# Patient Record
Sex: FEMALE | Race: WHITE | NOT HISPANIC OR LATINO
[De-identification: names, ages, dates, MRNs, and addresses within clinical notes are randomized per-mention and may not be internally consistent; named-entity substitution may affect disease eponyms.]

---

## 2017-03-16 ENCOUNTER — APPOINTMENT (OUTPATIENT)
Dept: MAMMOGRAPHY | Facility: CLINIC | Age: 77
End: 2017-03-16

## 2017-03-16 ENCOUNTER — OUTPATIENT (OUTPATIENT)
Dept: OUTPATIENT SERVICES | Facility: HOSPITAL | Age: 77
LOS: 1 days | End: 2017-03-16
Payer: MEDICARE

## 2017-03-16 ENCOUNTER — APPOINTMENT (OUTPATIENT)
Dept: ULTRASOUND IMAGING | Facility: CLINIC | Age: 77
End: 2017-03-16

## 2017-03-16 DIAGNOSIS — Z00.8 ENCOUNTER FOR OTHER GENERAL EXAMINATION: ICD-10-CM

## 2017-03-16 PROCEDURE — 77066 DX MAMMO INCL CAD BI: CPT

## 2017-03-16 PROCEDURE — 76641 ULTRASOUND BREAST COMPLETE: CPT

## 2017-03-16 PROCEDURE — G0279: CPT

## 2017-03-19 ENCOUNTER — RESULT REVIEW (OUTPATIENT)
Age: 77
End: 2017-03-19

## 2017-03-20 ENCOUNTER — OUTPATIENT (OUTPATIENT)
Dept: OUTPATIENT SERVICES | Facility: HOSPITAL | Age: 77
LOS: 1 days | End: 2017-03-20
Payer: MEDICARE

## 2017-03-20 ENCOUNTER — APPOINTMENT (OUTPATIENT)
Dept: ULTRASOUND IMAGING | Facility: CLINIC | Age: 77
End: 2017-03-20

## 2017-03-20 DIAGNOSIS — Z00.8 ENCOUNTER FOR OTHER GENERAL EXAMINATION: ICD-10-CM

## 2017-03-20 PROCEDURE — 76942 ECHO GUIDE FOR BIOPSY: CPT

## 2017-03-20 PROCEDURE — 19000 PUNCTURE ASPIR CYST BREAST: CPT

## 2017-03-20 PROCEDURE — 77065 DX MAMMO INCL CAD UNI: CPT

## 2017-10-04 ENCOUNTER — APPOINTMENT (OUTPATIENT)
Dept: SURGERY | Facility: CLINIC | Age: 77
End: 2017-10-04
Payer: MEDICARE

## 2017-10-04 PROCEDURE — 99213K: CUSTOM

## 2017-10-24 ENCOUNTER — OUTPATIENT (OUTPATIENT)
Dept: OUTPATIENT SERVICES | Facility: HOSPITAL | Age: 77
LOS: 1 days | End: 2017-10-24
Payer: MEDICARE

## 2017-10-24 ENCOUNTER — APPOINTMENT (OUTPATIENT)
Dept: ULTRASOUND IMAGING | Facility: CLINIC | Age: 77
End: 2017-10-24
Payer: MEDICARE

## 2017-10-24 DIAGNOSIS — Z00.8 ENCOUNTER FOR OTHER GENERAL EXAMINATION: ICD-10-CM

## 2017-10-24 PROCEDURE — 76641 ULTRASOUND BREAST COMPLETE: CPT | Mod: 26,50

## 2017-10-24 PROCEDURE — 76641 ULTRASOUND BREAST COMPLETE: CPT

## 2018-05-15 ENCOUNTER — APPOINTMENT (OUTPATIENT)
Dept: ULTRASOUND IMAGING | Facility: CLINIC | Age: 78
End: 2018-05-15
Payer: MEDICARE

## 2018-05-15 ENCOUNTER — OUTPATIENT (OUTPATIENT)
Dept: OUTPATIENT SERVICES | Facility: HOSPITAL | Age: 78
LOS: 1 days | End: 2018-05-15
Payer: MEDICARE

## 2018-05-15 ENCOUNTER — APPOINTMENT (OUTPATIENT)
Dept: MAMMOGRAPHY | Facility: CLINIC | Age: 78
End: 2018-05-15
Payer: MEDICARE

## 2018-05-15 DIAGNOSIS — Z00.8 ENCOUNTER FOR OTHER GENERAL EXAMINATION: ICD-10-CM

## 2018-05-15 PROCEDURE — 76641 ULTRASOUND BREAST COMPLETE: CPT | Mod: 26,50

## 2018-05-15 PROCEDURE — G0279: CPT | Mod: 26

## 2018-05-15 PROCEDURE — 77066 DX MAMMO INCL CAD BI: CPT

## 2018-05-15 PROCEDURE — G0279: CPT

## 2018-05-15 PROCEDURE — 76641 ULTRASOUND BREAST COMPLETE: CPT

## 2018-05-15 PROCEDURE — 77066 DX MAMMO INCL CAD BI: CPT | Mod: 26

## 2018-08-20 ENCOUNTER — APPOINTMENT (OUTPATIENT)
Dept: SURGERY | Facility: CLINIC | Age: 78
End: 2018-08-20
Payer: MEDICARE

## 2018-08-20 PROCEDURE — 99213K: CUSTOM

## 2019-06-17 ENCOUNTER — OUTPATIENT (OUTPATIENT)
Dept: OUTPATIENT SERVICES | Facility: HOSPITAL | Age: 79
LOS: 1 days | End: 2019-06-17
Payer: MEDICARE

## 2019-06-17 ENCOUNTER — APPOINTMENT (OUTPATIENT)
Dept: MAMMOGRAPHY | Facility: CLINIC | Age: 79
End: 2019-06-17
Payer: MEDICARE

## 2019-06-17 ENCOUNTER — APPOINTMENT (OUTPATIENT)
Dept: ULTRASOUND IMAGING | Facility: CLINIC | Age: 79
End: 2019-06-17
Payer: MEDICARE

## 2019-06-17 DIAGNOSIS — Z00.8 ENCOUNTER FOR OTHER GENERAL EXAMINATION: ICD-10-CM

## 2019-06-17 PROCEDURE — 77063 BREAST TOMOSYNTHESIS BI: CPT | Mod: 26

## 2019-06-17 PROCEDURE — 77063 BREAST TOMOSYNTHESIS BI: CPT

## 2019-06-17 PROCEDURE — G0279: CPT

## 2019-06-17 PROCEDURE — 76641 ULTRASOUND BREAST COMPLETE: CPT

## 2019-06-17 PROCEDURE — 77067 SCR MAMMO BI INCL CAD: CPT | Mod: 26

## 2019-06-17 PROCEDURE — 76641 ULTRASOUND BREAST COMPLETE: CPT | Mod: 26,50

## 2019-06-17 PROCEDURE — 77067 SCR MAMMO BI INCL CAD: CPT

## 2019-06-17 PROCEDURE — 77066 DX MAMMO INCL CAD BI: CPT

## 2019-09-16 ENCOUNTER — APPOINTMENT (OUTPATIENT)
Dept: SURGERY | Facility: CLINIC | Age: 79
End: 2019-09-16
Payer: MEDICARE

## 2019-09-16 PROCEDURE — 99213K: CUSTOM

## 2020-08-03 ENCOUNTER — RESULT REVIEW (OUTPATIENT)
Age: 80
End: 2020-08-03

## 2020-08-03 ENCOUNTER — OUTPATIENT (OUTPATIENT)
Dept: OUTPATIENT SERVICES | Facility: HOSPITAL | Age: 80
LOS: 1 days | End: 2020-08-03
Payer: MEDICARE

## 2020-08-03 ENCOUNTER — APPOINTMENT (OUTPATIENT)
Dept: MAMMOGRAPHY | Facility: CLINIC | Age: 80
End: 2020-08-03
Payer: MEDICARE

## 2020-08-03 ENCOUNTER — APPOINTMENT (OUTPATIENT)
Dept: ULTRASOUND IMAGING | Facility: CLINIC | Age: 80
End: 2020-08-03
Payer: MEDICARE

## 2020-08-03 DIAGNOSIS — Z00.8 ENCOUNTER FOR OTHER GENERAL EXAMINATION: ICD-10-CM

## 2020-08-03 PROCEDURE — 76641 ULTRASOUND BREAST COMPLETE: CPT | Mod: 26,50

## 2020-08-03 PROCEDURE — 77067 SCR MAMMO BI INCL CAD: CPT | Mod: 26

## 2020-08-03 PROCEDURE — 77067 SCR MAMMO BI INCL CAD: CPT

## 2020-08-03 PROCEDURE — 77063 BREAST TOMOSYNTHESIS BI: CPT

## 2020-08-03 PROCEDURE — 77063 BREAST TOMOSYNTHESIS BI: CPT | Mod: 26

## 2020-08-03 PROCEDURE — 76641 ULTRASOUND BREAST COMPLETE: CPT

## 2021-04-05 ENCOUNTER — APPOINTMENT (OUTPATIENT)
Dept: OPHTHALMOLOGY | Facility: CLINIC | Age: 81
End: 2021-04-05

## 2021-06-16 ENCOUNTER — APPOINTMENT (OUTPATIENT)
Dept: OPHTHALMOLOGY | Facility: CLINIC | Age: 81
End: 2021-06-16

## 2021-06-30 ENCOUNTER — NON-APPOINTMENT (OUTPATIENT)
Age: 81
End: 2021-06-30

## 2021-06-30 ENCOUNTER — APPOINTMENT (OUTPATIENT)
Dept: OPHTHALMOLOGY | Facility: CLINIC | Age: 81
End: 2021-06-30
Payer: MEDICARE

## 2021-06-30 PROCEDURE — 92060 SENSORIMOTOR EXAMINATION: CPT

## 2021-06-30 PROCEDURE — 92015 DETERMINE REFRACTIVE STATE: CPT

## 2021-06-30 PROCEDURE — 99215 OFFICE O/P EST HI 40 MIN: CPT

## 2021-08-24 ENCOUNTER — RESULT REVIEW (OUTPATIENT)
Age: 81
End: 2021-08-24

## 2021-08-24 ENCOUNTER — APPOINTMENT (OUTPATIENT)
Dept: MAMMOGRAPHY | Facility: CLINIC | Age: 81
End: 2021-08-24
Payer: MEDICARE

## 2021-08-24 ENCOUNTER — OUTPATIENT (OUTPATIENT)
Dept: OUTPATIENT SERVICES | Facility: HOSPITAL | Age: 81
LOS: 1 days | End: 2021-08-24
Payer: MEDICARE

## 2021-08-24 ENCOUNTER — APPOINTMENT (OUTPATIENT)
Dept: ULTRASOUND IMAGING | Facility: CLINIC | Age: 81
End: 2021-08-24
Payer: MEDICARE

## 2021-08-24 DIAGNOSIS — Z00.8 ENCOUNTER FOR OTHER GENERAL EXAMINATION: ICD-10-CM

## 2021-08-24 PROCEDURE — 76641 ULTRASOUND BREAST COMPLETE: CPT

## 2021-08-24 PROCEDURE — 76641 ULTRASOUND BREAST COMPLETE: CPT | Mod: 26,50

## 2021-08-24 PROCEDURE — G0279: CPT | Mod: 26

## 2021-08-24 PROCEDURE — 77066 DX MAMMO INCL CAD BI: CPT

## 2021-08-24 PROCEDURE — G0279: CPT

## 2021-08-24 PROCEDURE — 77066 DX MAMMO INCL CAD BI: CPT | Mod: 26

## 2021-08-30 ENCOUNTER — APPOINTMENT (OUTPATIENT)
Dept: SURGERY | Facility: CLINIC | Age: 81
End: 2021-08-30
Payer: MEDICARE

## 2021-08-30 PROCEDURE — 99214K: CUSTOM

## 2021-08-31 ENCOUNTER — OUTPATIENT (OUTPATIENT)
Dept: OUTPATIENT SERVICES | Facility: HOSPITAL | Age: 81
LOS: 1 days | End: 2021-08-31
Payer: MEDICARE

## 2021-08-31 ENCOUNTER — APPOINTMENT (OUTPATIENT)
Dept: MRI IMAGING | Facility: IMAGING CENTER | Age: 81
End: 2021-08-31
Payer: MEDICARE

## 2021-08-31 ENCOUNTER — RESULT REVIEW (OUTPATIENT)
Age: 81
End: 2021-08-31

## 2021-08-31 DIAGNOSIS — Z00.8 ENCOUNTER FOR OTHER GENERAL EXAMINATION: ICD-10-CM

## 2021-08-31 PROCEDURE — 77049 MRI BREAST C-+ W/CAD BI: CPT | Mod: 26,MH

## 2021-08-31 PROCEDURE — A9585: CPT

## 2021-08-31 PROCEDURE — C8937: CPT

## 2021-08-31 PROCEDURE — C8908: CPT | Mod: MH

## 2021-09-08 ENCOUNTER — OUTPATIENT (OUTPATIENT)
Dept: OUTPATIENT SERVICES | Facility: HOSPITAL | Age: 81
LOS: 1 days | End: 2021-09-08
Payer: MEDICARE

## 2021-09-08 ENCOUNTER — RESULT REVIEW (OUTPATIENT)
Age: 81
End: 2021-09-08

## 2021-09-08 ENCOUNTER — APPOINTMENT (OUTPATIENT)
Dept: MRI IMAGING | Facility: IMAGING CENTER | Age: 81
End: 2021-09-08
Payer: MEDICARE

## 2021-09-08 DIAGNOSIS — Z00.8 ENCOUNTER FOR OTHER GENERAL EXAMINATION: ICD-10-CM

## 2021-09-08 PROCEDURE — 19085 BX BREAST 1ST LESION MR IMAG: CPT

## 2021-09-08 PROCEDURE — 88360 TUMOR IMMUNOHISTOCHEM/MANUAL: CPT | Mod: 26

## 2021-09-08 PROCEDURE — 77065 DX MAMMO INCL CAD UNI: CPT

## 2021-09-08 PROCEDURE — 19086 BX BREAST ADD LESION MR IMAG: CPT | Mod: RT

## 2021-09-08 PROCEDURE — 19086 BX BREAST ADD LESION MR IMAG: CPT

## 2021-09-08 PROCEDURE — 77065 DX MAMMO INCL CAD UNI: CPT | Mod: 26,RT

## 2021-09-08 PROCEDURE — 19085 BX BREAST 1ST LESION MR IMAG: CPT | Mod: RT

## 2021-09-08 PROCEDURE — A9585: CPT

## 2021-09-08 PROCEDURE — 88305 TISSUE EXAM BY PATHOLOGIST: CPT | Mod: 26,59

## 2021-09-08 PROCEDURE — 88360 TUMOR IMMUNOHISTOCHEM/MANUAL: CPT

## 2021-09-08 PROCEDURE — 88305 TISSUE EXAM BY PATHOLOGIST: CPT

## 2021-09-09 LAB — SURGICAL PATHOLOGY STUDY: SIGNIFICANT CHANGE UP

## 2021-09-20 ENCOUNTER — OUTPATIENT (OUTPATIENT)
Dept: OUTPATIENT SERVICES | Facility: HOSPITAL | Age: 81
LOS: 1 days | End: 2021-09-20
Payer: MEDICARE

## 2021-09-20 ENCOUNTER — APPOINTMENT (OUTPATIENT)
Dept: MAMMOGRAPHY | Facility: IMAGING CENTER | Age: 81
End: 2021-09-20
Payer: MEDICARE

## 2021-09-20 ENCOUNTER — RESULT REVIEW (OUTPATIENT)
Age: 81
End: 2021-09-20

## 2021-09-20 VITALS
OXYGEN SATURATION: 98 % | HEART RATE: 81 BPM | WEIGHT: 151.02 LBS | DIASTOLIC BLOOD PRESSURE: 96 MMHG | HEIGHT: 64 IN | SYSTOLIC BLOOD PRESSURE: 166 MMHG | TEMPERATURE: 97 F | RESPIRATION RATE: 16 BRPM

## 2021-09-20 DIAGNOSIS — Z98.890 OTHER SPECIFIED POSTPROCEDURAL STATES: Chronic | ICD-10-CM

## 2021-09-20 DIAGNOSIS — H26.9 UNSPECIFIED CATARACT: Chronic | ICD-10-CM

## 2021-09-20 DIAGNOSIS — Z00.8 ENCOUNTER FOR OTHER GENERAL EXAMINATION: ICD-10-CM

## 2021-09-20 DIAGNOSIS — D05.11 INTRADUCTAL CARCINOMA IN SITU OF RIGHT BREAST: ICD-10-CM

## 2021-09-20 DIAGNOSIS — I10 ESSENTIAL (PRIMARY) HYPERTENSION: ICD-10-CM

## 2021-09-20 DIAGNOSIS — C50.919 MALIGNANT NEOPLASM OF UNSPECIFIED SITE OF UNSPECIFIED FEMALE BREAST: ICD-10-CM

## 2021-09-20 LAB
ALBUMIN SERPL ELPH-MCNC: 4.4 G/DL — SIGNIFICANT CHANGE UP (ref 3.3–5)
ALP SERPL-CCNC: 80 U/L — SIGNIFICANT CHANGE UP (ref 40–120)
ALT FLD-CCNC: 14 U/L — SIGNIFICANT CHANGE UP (ref 4–33)
ANION GAP SERPL CALC-SCNC: 9 MMOL/L — SIGNIFICANT CHANGE UP (ref 7–14)
AST SERPL-CCNC: 16 U/L — SIGNIFICANT CHANGE UP (ref 4–32)
BILIRUB SERPL-MCNC: 0.4 MG/DL — SIGNIFICANT CHANGE UP (ref 0.2–1.2)
BUN SERPL-MCNC: 12 MG/DL — SIGNIFICANT CHANGE UP (ref 7–23)
CALCIUM SERPL-MCNC: 9.5 MG/DL — SIGNIFICANT CHANGE UP (ref 8.4–10.5)
CHLORIDE SERPL-SCNC: 104 MMOL/L — SIGNIFICANT CHANGE UP (ref 98–107)
CO2 SERPL-SCNC: 27 MMOL/L — SIGNIFICANT CHANGE UP (ref 22–31)
CREAT SERPL-MCNC: 0.98 MG/DL — SIGNIFICANT CHANGE UP (ref 0.5–1.3)
GLUCOSE SERPL-MCNC: 85 MG/DL — SIGNIFICANT CHANGE UP (ref 70–99)
HCT VFR BLD CALC: 41.7 % — SIGNIFICANT CHANGE UP (ref 34.5–45)
HGB BLD-MCNC: 14.2 G/DL — SIGNIFICANT CHANGE UP (ref 11.5–15.5)
MCHC RBC-ENTMCNC: 30.9 PG — SIGNIFICANT CHANGE UP (ref 27–34)
MCHC RBC-ENTMCNC: 34.1 GM/DL — SIGNIFICANT CHANGE UP (ref 32–36)
MCV RBC AUTO: 90.8 FL — SIGNIFICANT CHANGE UP (ref 80–100)
NRBC # BLD: 0 /100 WBCS — SIGNIFICANT CHANGE UP
NRBC # FLD: 0 K/UL — SIGNIFICANT CHANGE UP
PLATELET # BLD AUTO: 273 K/UL — SIGNIFICANT CHANGE UP (ref 150–400)
POTASSIUM SERPL-MCNC: 3.8 MMOL/L — SIGNIFICANT CHANGE UP (ref 3.5–5.3)
POTASSIUM SERPL-SCNC: 3.8 MMOL/L — SIGNIFICANT CHANGE UP (ref 3.5–5.3)
PROT SERPL-MCNC: 7.6 G/DL — SIGNIFICANT CHANGE UP (ref 6–8.3)
RBC # BLD: 4.59 M/UL — SIGNIFICANT CHANGE UP (ref 3.8–5.2)
RBC # FLD: 13.3 % — SIGNIFICANT CHANGE UP (ref 10.3–14.5)
SODIUM SERPL-SCNC: 140 MMOL/L — SIGNIFICANT CHANGE UP (ref 135–145)
WBC # BLD: 8.2 K/UL — SIGNIFICANT CHANGE UP (ref 3.8–10.5)
WBC # FLD AUTO: 8.2 K/UL — SIGNIFICANT CHANGE UP (ref 3.8–10.5)

## 2021-09-20 PROCEDURE — C1739: CPT

## 2021-09-20 PROCEDURE — 93010 ELECTROCARDIOGRAM REPORT: CPT

## 2021-09-20 PROCEDURE — 19281 PERQ DEVICE BREAST 1ST IMAG: CPT

## 2021-09-20 PROCEDURE — 19281 PERQ DEVICE BREAST 1ST IMAG: CPT | Mod: RT

## 2021-09-20 RX ORDER — SODIUM CHLORIDE 9 MG/ML
1000 INJECTION, SOLUTION INTRAVENOUS
Refills: 0 | Status: DISCONTINUED | OUTPATIENT
Start: 2021-09-28 | End: 2021-10-12

## 2021-09-20 NOTE — H&P PST ADULT - PROBLEM SELECTOR PLAN 1
Right Partial Mastectomy with Seed Localization    Pre op instructions including Hibiclens with teach back reviewed with pt ; pt verbalized good understanding of pre op instructions     Pt to Dr Pascual for pre op medical evaluation and evaluation of BP

## 2021-09-20 NOTE — H&P PST ADULT - NEGATIVE OPHTHALMOLOGIC SYMPTOMS
Records received. Placed with provider for review for visit.    s/p cataract excision/no diplopia/no photophobia/no blurred vision L/no blurred vision R

## 2021-09-20 NOTE — H&P PST ADULT - NSANTHOSAYNRD_GEN_A_CORE
No. ELIS screening performed.  STOP BANG Legend: 0-2 = LOW Risk; 3-4 = INTERMEDIATE Risk; 5-8 = HIGH Risk

## 2021-09-20 NOTE — H&P PST ADULT - NSICDXPASTSURGICALHX_GEN_ALL_CORE_FT
PAST SURGICAL HISTORY:  Bilateral cataracts Left 11/2020, Right 5/21    H/O lumpectomy Right breast 2013    History of tonsillectomy as a child    Left breast mass 2006 lumpectomy, removal of 2 lymph nodes

## 2021-09-20 NOTE — H&P PST ADULT - PROBLEM SELECTOR PLAN 2
BP elevated in PST  Pt denies cp, denies palpitations, denies sob ; pt offers no c/o  Pt to Dr Pascual for pre op evaluation  Call to Dr Ramsay; s/w bre

## 2021-09-20 NOTE — H&P PST ADULT - NS MD HP INPLANTS MED DEV
Breast clips ;Right with seed localization, Bilateral lenses s/p cataract surgery Breast clips ;Right breast with seed localization, Bilateral lenses s/p cataract surgery

## 2021-09-20 NOTE — H&P PST ADULT - NSICDXPASTMEDICALHX_GEN_ALL_CORE_FT
PAST MEDICAL HISTORY:  Anxiety     Breast cancer 2006  Left breast s/p RT , right Breast 2013 ; tx surgically tx RT       Goiter per pt monitored by pcp    Hyperlipidemia      PAST MEDICAL HISTORY:  Anxiety     Breast cancer 2006  Left breast tx surgically s/p RT , right Breast 2013 ; tx surgically s/p RT       Goiter per pt monitored by pcp    Hyperlipidemia

## 2021-09-20 NOTE — H&P PST ADULT - HISTORY OF PRESENT ILLNESS
Pt is n 81  y.o. female ; pt with h/o  Left Breast Cancer ; s/p Left Breast Lumpectomy ; tx RT. Pt s/p Right Breast Lumpectomy 2013 ; per pt DCIS. Pt s/p  Mammogram / Sonogram 8/21. Pt reports " ? blood right nipple " Pt to surgeon ; s/p MRI ; pt reports " something in my right breast " Pt s/p Biopsy ; pt now presents for  Right Partial Mastectomy with Seed Localization    Pt denies breast pain  Pt is an  81  y.o. female ; pt with h/o  Left Breast Cancer ; s/p Left Breast Lumpectomy ; tx RT. Pt s/p Right Breast Lumpectomy 2013 ; per pt"  DCIS" . Pt s/p  Mammogram / Sonogram 8/21. Pt reports " ?  blood right nipple " Pt to surgeon ; s/p MRI ; pt reports " something in my right breast " Pt s/p Biopsy ; pt now presents for  Right Partial Mastectomy with Seed Localization    Pt denies breast pain

## 2021-09-24 ENCOUNTER — APPOINTMENT (OUTPATIENT)
Dept: DERMATOLOGY | Facility: CLINIC | Age: 81
End: 2021-09-24
Payer: MEDICARE

## 2021-09-24 PROCEDURE — 99203 OFFICE O/P NEW LOW 30 MIN: CPT

## 2021-09-25 ENCOUNTER — APPOINTMENT (OUTPATIENT)
Dept: DISASTER EMERGENCY | Facility: CLINIC | Age: 81
End: 2021-09-25

## 2021-09-26 LAB — SARS-COV-2 N GENE NPH QL NAA+PROBE: NOT DETECTED

## 2021-09-27 NOTE — ASU PATIENT PROFILE, ADULT - NSICDXPASTMEDICALHX_GEN_ALL_CORE_FT
PAST MEDICAL HISTORY:  Anxiety     Breast cancer 2006  Left breast tx surgically s/p RT , right Breast 2013 ; tx surgically s/p RT       Goiter per pt monitored by pcp    Hyperlipidemia

## 2021-09-28 ENCOUNTER — APPOINTMENT (OUTPATIENT)
Dept: SURGERY | Facility: HOSPITAL | Age: 81
End: 2021-09-28

## 2021-09-28 ENCOUNTER — OUTPATIENT (OUTPATIENT)
Dept: OUTPATIENT SERVICES | Facility: HOSPITAL | Age: 81
LOS: 1 days | Discharge: HOME | End: 2021-09-28
Payer: MEDICARE

## 2021-09-28 ENCOUNTER — RESULT REVIEW (OUTPATIENT)
Age: 81
End: 2021-09-28

## 2021-09-28 VITALS
OXYGEN SATURATION: 100 % | SYSTOLIC BLOOD PRESSURE: 148 MMHG | RESPIRATION RATE: 16 BRPM | HEART RATE: 68 BPM | DIASTOLIC BLOOD PRESSURE: 89 MMHG

## 2021-09-28 VITALS
RESPIRATION RATE: 16 BRPM | OXYGEN SATURATION: 96 % | SYSTOLIC BLOOD PRESSURE: 154 MMHG | HEIGHT: 64 IN | TEMPERATURE: 98 F | DIASTOLIC BLOOD PRESSURE: 82 MMHG | WEIGHT: 151.02 LBS | HEART RATE: 82 BPM

## 2021-09-28 DIAGNOSIS — Z98.890 OTHER SPECIFIED POSTPROCEDURAL STATES: Chronic | ICD-10-CM

## 2021-09-28 DIAGNOSIS — D05.11 INTRADUCTAL CARCINOMA IN SITU OF RIGHT BREAST: ICD-10-CM

## 2021-09-28 DIAGNOSIS — H26.9 UNSPECIFIED CATARACT: Chronic | ICD-10-CM

## 2021-09-28 PROCEDURE — 88307 TISSUE EXAM BY PATHOLOGIST: CPT | Mod: 26

## 2021-09-28 PROCEDURE — 88341 IMHCHEM/IMCYTCHM EA ADD ANTB: CPT | Mod: 26,59

## 2021-09-28 PROCEDURE — 19301K: CUSTOM | Mod: RT

## 2021-09-28 PROCEDURE — 88305 TISSUE EXAM BY PATHOLOGIST: CPT | Mod: 26

## 2021-09-28 PROCEDURE — 88360 TUMOR IMMUNOHISTOCHEM/MANUAL: CPT | Mod: 26

## 2021-09-28 PROCEDURE — 76098 X-RAY EXAM SURGICAL SPECIMEN: CPT | Mod: 26

## 2021-09-28 PROCEDURE — 88342 IMHCHEM/IMCYTCHM 1ST ANTB: CPT | Mod: 26,59

## 2021-09-28 RX ORDER — ONDANSETRON 8 MG/1
4 TABLET, FILM COATED ORAL ONCE
Refills: 0 | Status: DISCONTINUED | OUTPATIENT
Start: 2021-09-28 | End: 2021-10-12

## 2021-09-28 RX ORDER — SODIUM CHLORIDE 9 MG/ML
1000 INJECTION, SOLUTION INTRAVENOUS
Refills: 0 | Status: DISCONTINUED | OUTPATIENT
Start: 2021-09-28 | End: 2021-10-12

## 2021-09-28 RX ORDER — FENTANYL CITRATE 50 UG/ML
50 INJECTION INTRAVENOUS
Refills: 0 | Status: DISCONTINUED | OUTPATIENT
Start: 2021-09-28 | End: 2021-09-28

## 2021-09-28 RX ORDER — FENTANYL CITRATE 50 UG/ML
25 INJECTION INTRAVENOUS
Refills: 0 | Status: DISCONTINUED | OUTPATIENT
Start: 2021-09-28 | End: 2021-09-28

## 2021-09-28 RX ADMIN — FENTANYL CITRATE 50 MICROGRAM(S): 50 INJECTION INTRAVENOUS at 11:06

## 2021-09-28 NOTE — ASU DISCHARGE PLAN (ADULT/PEDIATRIC) - FOLLOW UP APPOINTMENTS
[facility] After 8 pm PACU /[facility]

## 2021-09-28 NOTE — ASU DISCHARGE PLAN (ADULT/PEDIATRIC) - CALL YOUR DOCTOR IF YOU HAVE ANY OF THE FOLLOWING:
Bleeding that does not stop/Swelling that gets worse/Pain not relieved by Medications/Fever greater than (need to indicate Fahrenheit or Celsius)/Wound/Surgical Site with redness, or foul smelling discharge or pus/Numbness, tingling, color or temperature change to extremity Bleeding that does not stop/Swelling that gets worse/Pain not relieved by Medications/Fever greater than (need to indicate Fahrenheit or Celsius)/Wound/Surgical Site with redness, or foul smelling discharge or pus/Numbness, tingling, color or temperature change to extremity/Inability to tolerate liquids or foods

## 2021-09-28 NOTE — ASU DISCHARGE PLAN (ADULT/PEDIATRIC) - CARE PROVIDER_API CALL
Cassandra Ramsay)  [facility]  [address], [address]  [place], [place] [id number]  Phone: [phone]  Fax: [phone]  Follow Up Time:

## 2021-09-28 NOTE — ASU DISCHARGE PLAN (ADULT/PEDIATRIC) - ASU DC SPECIAL INSTRUCTIONSFT
ACTIVITY: Full activity, including driving next day as tolerated. No vigorous exercise. Wear bra as desired or tolerated.  BATHING: Remove outer bandage next day. Shower allowed. Pat wound dry.  MEDICATIONS: You may take  Extra-Strength Tylenol or similar medication (aspirin or ibuprofen) as needed.   BLEEDING: After surgery, some blood may escape from under the tape. It is of no consequence. The paper tape may fall off after several days. If not, Dr. Ramsay will remove it in her office.  BRUISING: As the days go by, black and blue areas may become more ticeable. These areas will disappear within several weeks. In addition, the area around the incision may feel very hard and look swollen. It is normal and it may take several months to subside.     CALL OFFICE:  1) If brisk bleeding is occuring through several new bandages  2) If you breast becomes warm or red, with or without pus  3) If you have a fever greater than 101F    FOLLOW UP: Please call Dr. Ramsay's office and make an appointment for approximately one week from now. The number is: [phone]

## 2021-10-01 LAB — SURGICAL PATHOLOGY STUDY: SIGNIFICANT CHANGE UP

## 2021-10-06 ENCOUNTER — APPOINTMENT (OUTPATIENT)
Dept: SURGERY | Facility: CLINIC | Age: 81
End: 2021-10-06
Payer: MEDICARE

## 2021-10-06 PROCEDURE — 99024 POSTOP FOLLOW-UP VISIT: CPT

## 2021-10-13 ENCOUNTER — OUTPATIENT (OUTPATIENT)
Dept: OUTPATIENT SERVICES | Facility: HOSPITAL | Age: 81
LOS: 1 days | Discharge: HOME | End: 2021-10-13

## 2021-10-13 DIAGNOSIS — H26.9 UNSPECIFIED CATARACT: Chronic | ICD-10-CM

## 2021-10-13 DIAGNOSIS — Z98.890 OTHER SPECIFIED POSTPROCEDURAL STATES: Chronic | ICD-10-CM

## 2021-10-13 DIAGNOSIS — C50.919 MALIGNANT NEOPLASM OF UNSPECIFIED SITE OF UNSPECIFIED FEMALE BREAST: ICD-10-CM

## 2021-10-14 ENCOUNTER — APPOINTMENT (OUTPATIENT)
Dept: HEMATOLOGY ONCOLOGY | Facility: CLINIC | Age: 81
End: 2021-10-14
Payer: MEDICARE

## 2021-10-14 VITALS
TEMPERATURE: 97.5 F | HEART RATE: 74 BPM | OXYGEN SATURATION: 99 % | SYSTOLIC BLOOD PRESSURE: 190 MMHG | BODY MASS INDEX: 25.01 KG/M2 | HEIGHT: 66 IN | RESPIRATION RATE: 16 BRPM | WEIGHT: 155.62 LBS | DIASTOLIC BLOOD PRESSURE: 70 MMHG

## 2021-10-14 PROCEDURE — 99205 OFFICE O/P NEW HI 60 MIN: CPT

## 2021-10-14 NOTE — ASSESSMENT
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 s/p right lumpectomy. \par  \par We discussed the good prognosis of her early stage, hormone receptor positive, HER2-negative, lymph node-negative breast cancer.  We discussed systemic adjuvant treatment with letrozole 2.5mg daily for at least 5 years. We reviewed side effects which include but are not limited to hot flashes, arthralgias, nausea, vomiting, fatigue, fracture, mood disturbance, cardiac symptoms, vaginal dryness, hair loss.  \par \par Pt reports she has a history of osteopenia and feels imbalanced due to her vision issues.  She wears prism glasses to help with her vision but still concerned about having a fall.  She will send me a copy of her last bone density to assess the extent of her bone loss.  She states this was years ago and I recommended she repeat bone density now to see if there is significant progression.  We discussed that this is not a contraindication to starting on an aromatase inhibitor but she would like to wait given the concerns above.  \par \par Pt to fu via TEB in 2 weeks after repeat bone density and to discuss further management.\par \par Patient was given the time to ask questions which I answered to the best of my ability and to her apparent satisfaction.  I encouraged her to call me with any additional questions.

## 2021-10-14 NOTE — CONSULT LETTER
[Dear  ___] : Dear  [unfilled], [( Thank you for referring [unfilled] for consultation for _____ )] : Thank you for referring [unfilled] for consultation for [unfilled] [Please see my note below.] : Please see my note below. [Referral Closing:] : Thank you very much for seeing this patient.  If you have any questions, please do not hesitate to contact me. [Sincerely,] : Sincerely, [FreeTextEntry3] : Ibis Spears MD

## 2021-10-14 NOTE — HISTORY OF PRESENT ILLNESS
[Disease: _____________________] : Disease: [unfilled] [T: ___] : T[unfilled] [N: ___] : N[unfilled] [M: ___] : M[unfilled] [AJCC Stage: ____] : AJCC Stage: [unfilled] [de-identified] : BRIAN MART  is a 81 year old female here for initial consultation for management of right breast cancer.\par  \par h/o left lumpectomy in 1/28/2006 for IDC, s/p RT, arimidex c/b excruciating bone pains, then on tamoxifen 14mos c/b uterine thickening, then on femara, maybe aromasin total 4 years. \par h/o right lumpectomy in 11/12/2013 for DCIS, s/p RT at [facility]\par \par Pt c/o rash and bloody nipple discharge in right breast.  Patient had a diagnostic mammo/US on 8/24/21 which showed stable calcification, cyst, fat necrosis but on US finding in left breast left 8:00 retroareolar 7mm circumscribed hypoechoic nodule, seen back in 2017 though slightly larger, 6 mos fu rec for stability. No sonographic correlate retroareolar right breast for blood nipple dc complaint, consider MRI,  BIRADS3.\par \par MRI 8/31/21 showed 2.3cm clumped non-mass enhancement in upper central right breast, MRI bx rec; 8mm non mass enhancement in slightly upper inner right breast, MRI biopsy rec.  No suspicious enhancement in left breast.\par \par MRI biopsy on 9/8/21 revealed right upper central breast with DCIS, low to intermed grade, solid and cribriform type, with calcifications and central necrosis, also involving a radial scar, ER 95%, HI 95%, right upper inner breast biopsy benign with fat necrosis.\par \par Pt underwent a right lumpectomy on 9/28/21 with Dr. Cassandra Ramsay which showed 2mm invasive well differentiated ductal carcinoma with tubular features, grade 1, ER 90%, HI 70%, HER2 neg IHC 0/1+ staining;  assoc DCIS 40mm, solid and cribriform types with intermed nuclear grade, necrosis and microcalcifications, biopsy site changes. Right superior margin with radial sclerosing lesion, right lateral margin DCIS 1mm from inked margin. pT1aNx ER 90%, HI 70%, HER2 neg IHC 0/1+. \par \par HEALTH MAINTENANCE\par PCP Dr. 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

## 2021-10-14 NOTE — PHYSICAL EXAM
[Normal] : affect appropriate [Restricted in physically strenuous activity but ambulatory and able to carry out work of a light or sedentary nature] : Status 1- Restricted in physically strenuous activity but ambulatory and able to carry out work of a light or sedentary nature, e.g., light house work, office work [de-identified] : right breast lumpectomy with associated seroma, left lumpectomy scar, left axillary lipoma, no axillary LAD

## 2021-10-26 ENCOUNTER — APPOINTMENT (OUTPATIENT)
Dept: HEMATOLOGY ONCOLOGY | Facility: CLINIC | Age: 81
End: 2021-10-26
Payer: MEDICARE

## 2021-10-26 PROCEDURE — 99214 OFFICE O/P EST MOD 30 MIN: CPT | Mod: 95

## 2021-10-26 NOTE — HISTORY OF PRESENT ILLNESS
[Home] : at home, [unfilled] , at the time of the visit. [Medical Office: ([facility])___] : at the medical office located in  [Verbal consent obtained from patient] : the patient, [unfilled] [Disease: _____________________] : Disease: [unfilled] [T: ___] : T[unfilled] [N: ___] : N[unfilled] [M: ___] : M[unfilled] [AJCC Stage: ____] : AJCC Stage: [unfilled] [Spouse] : spouse [de-identified] : BRIAN MART  is a 81 year old female here for management of right breast cancer.\par  \par h/o left lumpectomy in 1/28/2006 for IDC, s/p RT, arimidex c/b excruciating bone pains, then on tamoxifen 14mos c/b uterine thickening, then on femara, maybe aromasin total 4 years. \par h/o right lumpectomy in 11/12/2013 for DCIS, s/p RT at [facility]\par \par Pt c/o rash and bloody nipple discharge in right breast.  Patient had a diagnostic mammo/US on 8/24/21 which showed stable calcification, cyst, fat necrosis but on US finding in left breast left 8:00 retroareolar 7mm circumscribed hypoechoic nodule, seen back in 2017 though slightly larger, 6 mos fu rec for stability. No sonographic correlate retroareolar right breast for blood nipple dc complaint, consider MRI,  BIRADS3.\par \par MRI 8/31/21 showed 2.3cm clumped non-mass enhancement in upper central right breast, MRI bx rec; 8mm non mass enhancement in slightly upper inner right breast, MRI biopsy rec.  No suspicious enhancement in left breast.\par \par MRI biopsy on 9/8/21 revealed right upper central breast with DCIS, low to intermed grade, solid and cribriform type, with calcifications and central necrosis, also involving a radial scar, ER 95%, TX 95%, right upper inner breast biopsy benign with fat necrosis.\par \par Pt underwent a right lumpectomy on 9/28/21 with Dr. 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 Right superior margin with radial sclerosing lesion, right lateral margin DCIS 1mm from inked margin. pT1aNx ER 90%, TX 70%, HER2 neg IHC 0/1+.  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 \par Will fu with Dr. Ramsay tomorrow 10/27. \par Reviewed bone density from 2014 and 2009.  Osteopenia -2.3 in 2014.  Pt will schedule repeat bone density.\par Pt will start letrozole, which patient feels she tolerated best.  \par Afraid shes going to fall due to recent vision issues.  Wants referral for ophtho. \par Will fu with ENDO for osteopenia. \par \par HEALTH MAINTENANCE\par PCP Dr. Clint Pascual, last visit 2/2020, missed visits due to COVID pandemic, next 1/2022\par OPHTHO bilateral cataract surgery 9/2020 left, 5/21/21 right eye.  wearing prism glasses which is slightly helping. \par GYN last year, no vaginal bleeding or spotting.\par GI no previous colonoscopy.\par Bone density had it previously, per pt had osteopenia, couple years; referral given.\par DERM recent rash ?bed bugs\par walks with walker, difficult ambulating due to vision, wearing prism glasses

## 2021-10-26 NOTE — ASSESSMENT
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 s/p right lumpectomy. \par  \par -pt will start letrozole 2.5mg daily for at least 5 years. Script sent to pharmacy.\par -we again reviewed side effects which include but are not limited to hot flashes, arthralgias, nausea, vomiting, fatigue, fracture, mood disturbance, cardiac symptoms, vaginal dryness, hair loss.  \par -pt to schedule repeat bone density; osteopenia in 2014.\par -FU with endocrinology for osteopenia, pt to make appt\par -will give referral for opthalmology given concern re: her vision\par -labs next visit\par -continue to fu with Dr. Ramsay\par -I encouraged her to call me with any additional questions.

## 2021-10-27 ENCOUNTER — APPOINTMENT (OUTPATIENT)
Dept: SURGERY | Facility: CLINIC | Age: 81
End: 2021-10-27
Payer: MEDICARE

## 2021-10-27 PROCEDURE — 99024 POSTOP FOLLOW-UP VISIT: CPT

## 2021-11-04 ENCOUNTER — RESULT REVIEW (OUTPATIENT)
Age: 81
End: 2021-11-04

## 2021-12-01 ENCOUNTER — APPOINTMENT (OUTPATIENT)
Dept: SURGERY | Facility: CLINIC | Age: 81
End: 2021-12-01
Payer: MEDICARE

## 2021-12-01 PROCEDURE — 99024 POSTOP FOLLOW-UP VISIT: CPT

## 2021-12-14 ENCOUNTER — OUTPATIENT (OUTPATIENT)
Dept: OUTPATIENT SERVICES | Facility: HOSPITAL | Age: 81
LOS: 1 days | End: 2021-12-14
Payer: MEDICARE

## 2021-12-14 ENCOUNTER — APPOINTMENT (OUTPATIENT)
Dept: RADIOLOGY | Facility: IMAGING CENTER | Age: 81
End: 2021-12-14
Payer: MEDICARE

## 2021-12-14 DIAGNOSIS — Z00.8 ENCOUNTER FOR OTHER GENERAL EXAMINATION: ICD-10-CM

## 2021-12-14 DIAGNOSIS — H26.9 UNSPECIFIED CATARACT: Chronic | ICD-10-CM

## 2021-12-14 DIAGNOSIS — Z98.890 OTHER SPECIFIED POSTPROCEDURAL STATES: Chronic | ICD-10-CM

## 2021-12-14 PROCEDURE — 77080 DXA BONE DENSITY AXIAL: CPT

## 2021-12-14 PROCEDURE — 77080 DXA BONE DENSITY AXIAL: CPT | Mod: 26

## 2021-12-22 ENCOUNTER — APPOINTMENT (OUTPATIENT)
Dept: HEMATOLOGY ONCOLOGY | Facility: CLINIC | Age: 81
End: 2021-12-22

## 2022-01-06 ENCOUNTER — OUTPATIENT (OUTPATIENT)
Dept: OUTPATIENT SERVICES | Facility: HOSPITAL | Age: 82
LOS: 1 days | Discharge: HOME | End: 2022-01-06

## 2022-01-06 DIAGNOSIS — H26.9 UNSPECIFIED CATARACT: Chronic | ICD-10-CM

## 2022-01-06 DIAGNOSIS — Z98.890 OTHER SPECIFIED POSTPROCEDURAL STATES: Chronic | ICD-10-CM

## 2022-01-06 DIAGNOSIS — C50.919 MALIGNANT NEOPLASM OF UNSPECIFIED SITE OF UNSPECIFIED FEMALE BREAST: ICD-10-CM

## 2022-01-07 ENCOUNTER — APPOINTMENT (OUTPATIENT)
Dept: HEMATOLOGY ONCOLOGY | Facility: CLINIC | Age: 82
End: 2022-01-07
Payer: MEDICARE

## 2022-01-07 PROCEDURE — 99214 OFFICE O/P EST MOD 30 MIN: CPT | Mod: 95

## 2022-01-07 NOTE — ASSESSMENT
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 s/p right lumpectomy. \par  \par -has not started letrozole.  Pt states she will start after her dental extraction next week.  She is concerned about eye effects of anastrazole, we discussed most are rare side effects but would follow closely with ophthalmology during treatment.\par -pt will see Dr. Pineda for osteoporosis \par -pt denies any new breast changes or symptoms to suggest recurrence.\par -continue to fu with Dr. Cassandra Ramsay\par -next mammo/US in 8/2022 \par -labs next visit\par -I encouraged her to call me with any additional questions.

## 2022-01-07 NOTE — HISTORY OF PRESENT ILLNESS
[Disease: _____________________] : Disease: [unfilled] [T: ___] : T[unfilled] [N: ___] : N[unfilled] [M: ___] : M[unfilled] [AJCC Stage: ____] : AJCC Stage: [unfilled] [Home] : at home, [unfilled] , at the time of the visit. [Medical Office: ([facility])___] : at the medical office located in  [Spouse] : spouse [Verbal consent obtained from patient] : the patient, [unfilled] [de-identified] : BRIAN MART  is a 81 year old female here for management of right breast cancer.\par  \par h/o left lumpectomy in 1/28/2006 for IDC, s/p RT, arimidex c/b excruciating bone pains, then on tamoxifen 14mos c/b uterine thickening, then on femara, maybe aromasin total 4 years. 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  Patient had a diagnostic mammo/US on 8/24/21 which showed stable calcification, cyst, fat necrosis but on US finding in left breast left 8:00 retroareolar 7mm circumscribed hypoechoic nodule, seen back in 2017 though slightly larger, 6 mos fu rec for stability. No sonographic correlate retroareolar right breast for blood nipple dc complaint, consider MRI,  BIRADS3.\par \par MRI 8/31/21 showed 2.3cm clumped non-mass enhancement in upper central right breast, MRI bx rec; 8mm non mass enhancement in slightly upper inner right breast, MRI biopsy rec.  No suspicious enhancement in left breast.\par \par MRI biopsy on 9/8/21 revealed right upper central breast with DCIS, low to intermed grade, solid and cribriform type, with calcifications and central necrosis, also involving a radial scar, ER 95%, [place] 95%, right upper inner breast biopsy benign with fat necrosis.\par \par Pt underwent a right lumpectomy on 9/28/21 with Dr. Cassandra Ramsay which showed 2mm invasive well differentiated ductal carcinoma with tubular features, grade 1, ER 90%, [place] 70%, HER2 neg IHC 0/1+ staining;  assoc DCIS 40mm, solid and cribriform types with intermed nuclear grade, necrosis and microcalcifications, biopsy site changes. Right superior margin with radial sclerosing lesion, right lateral margin DCIS 1mm from inked margin. pT1aNx ER 90%, [place] 70%, HER2 neg IHC 0/1+.  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 The visit was conducted on [employer] platform. Verbal consent given on 01/07/2022 at 10:20AM  by BRIAN MART, patient. \par Did not start letrozole yet.  Will be having dental procedure this month and wants to start after.  Advised that pt can start prior to dental procedure.\par Will see Frank Pineda for osteoporosis after dental work.\par Saw Dr. Ramsay and had drainage of seroma x 2, will be seeing again in 3/2022. \par Next mammo/US in 8/2022.\par Labs next visit.\par \par HEALTH MAINTENANCE\par PCP Dr. Clint Pascual, last visit 2/2020, missed visits due to COVID pandemic, next seeing 1/2022\par OPHTHO bilateral cataract surgery 9/2020 left, 5/21/21 right eye.  wearing prism glasses for exotropia which is slightly helping. \par GYN last year, no vaginal bleeding or spotting.\par GI no previous colonoscopy.\par BMD 12/14/21 osteoporosis femoral neck -2.7, osteopenia -2.4 in total hip; has new pt appt with Dr. Pineda in 3/2022. \par DERM rash 2/2 ?bed bugs, now resolved\par walks with walker, difficult ambulating due to vision, wearing prism glasses

## 2022-03-16 ENCOUNTER — APPOINTMENT (OUTPATIENT)
Dept: SURGERY | Facility: CLINIC | Age: 82
End: 2022-03-16
Payer: MEDICARE

## 2022-03-16 PROCEDURE — 99213K: CUSTOM

## 2022-03-24 ENCOUNTER — NON-APPOINTMENT (OUTPATIENT)
Age: 82
End: 2022-03-24

## 2022-03-28 ENCOUNTER — NON-APPOINTMENT (OUTPATIENT)
Age: 82
End: 2022-03-28

## 2022-03-28 ENCOUNTER — APPOINTMENT (OUTPATIENT)
Dept: ENDOCRINOLOGY | Facility: CLINIC | Age: 82
End: 2022-03-28
Payer: MEDICARE

## 2022-03-28 VITALS
OXYGEN SATURATION: 99 % | TEMPERATURE: 97.6 F | HEIGHT: 66 IN | WEIGHT: 151.56 LBS | DIASTOLIC BLOOD PRESSURE: 82 MMHG | HEART RATE: 78 BPM | BODY MASS INDEX: 24.36 KG/M2 | SYSTOLIC BLOOD PRESSURE: 136 MMHG

## 2022-03-28 PROCEDURE — 99204 OFFICE O/P NEW MOD 45 MIN: CPT

## 2022-04-02 NOTE — HISTORY OF PRESENT ILLNESS
[FreeTextEntry1] : Ms. MART  is a 81 year old  female  who presents for initial endocrine evaluation. She presents with regard to a history of osteoporosis diagnosed on most recent DEXA scan from 12/14/21. Denies history of recent falls/injuries bony fractures. Is unsure of family history of osteoporosis. Denies use of steroids or prolonged immobilization. Denies hx of celiac's or other malabsorption syndromes or hx of kidney stones. She has history of breast cancer b/l diagnosed in 2005 s/p left lumpectomy in 2007 and right lumpectomy in 2014 with radiation b/l. Recent MRI from August 2021 did show DCIS and additional right lumpectomy in 09/2021. She was instructed to start Femora (aromatase inhibitor)  afterwards, however, patient did not start it. She did take another aromatase inhibitor ( Rimadex for 8 months, tamoxifen for 1.5 year, Femora, Aromasin) for a total of 4 years in the past with breaks and has had side effects of bone/joint pain in past. Last time she has used an aromatase inhibitor was 2011. \par Is currently not on any chemo/radiation. \par She feels that she does not get enough calcium from diet. Rarely eats milk/yogurt. \par \par Latest dexa scan from  12/14/21 did show:\par T score spine -0.9\par T score femoral neck -2.7\par T score total hip -2.4\par Comparison is limited to prior scan from 2009 due to different equipment. Did have another scan in 2014 at [facility] [place] which showed osteopenia. \par Has not been physically active of late.\par \par  Additional medical history includes that of Olvin Zavala following with Dr. mays. She is s/p right lumpectomy 9/28/2021, HTN, HLD, mild CKD (latest GFR 50 in 2020) \par Medications:  Amlodipine 2.5 mg, calcium with vitamin 3 (500 mg /vitamin D 400 IU) along with vit d 3 1000 IU alternating with vitamin D 3 2000 IU daily. \par \par Surg Onc: Dr. Ramsay \par PMD: Dr. Pascual \par

## 2022-08-18 NOTE — ASU PATIENT PROFILE, ADULT - NS TRANSFER PATIENT BELONGINGS
Patients partner believes she was overdosed on bupivacaine and now her body is attacking its own nervous system on a molecular level        Teresa Patricio RN  08/18/22 [id number]
Cell Phone/PDA (specify)/Clothing

## 2022-10-13 ENCOUNTER — OUTPATIENT (OUTPATIENT)
Dept: OUTPATIENT SERVICES | Facility: HOSPITAL | Age: 82
LOS: 1 days | Discharge: HOME | End: 2022-10-13

## 2022-10-13 DIAGNOSIS — H26.9 UNSPECIFIED CATARACT: Chronic | ICD-10-CM

## 2022-10-13 DIAGNOSIS — C50.919 MALIGNANT NEOPLASM OF UNSPECIFIED SITE OF UNSPECIFIED FEMALE BREAST: ICD-10-CM

## 2022-10-13 DIAGNOSIS — Z98.890 OTHER SPECIFIED POSTPROCEDURAL STATES: Chronic | ICD-10-CM

## 2022-10-17 ENCOUNTER — APPOINTMENT (OUTPATIENT)
Dept: HEMATOLOGY ONCOLOGY | Facility: CLINIC | Age: 82
End: 2022-10-17

## 2022-10-17 VITALS
OXYGEN SATURATION: 98 % | HEART RATE: 81 BPM | SYSTOLIC BLOOD PRESSURE: 151 MMHG | BODY MASS INDEX: 23.92 KG/M2 | HEIGHT: 66 IN | RESPIRATION RATE: 16 BRPM | WEIGHT: 148.81 LBS | TEMPERATURE: 97.2 F | DIASTOLIC BLOOD PRESSURE: 79 MMHG

## 2022-10-17 PROCEDURE — 99215 OFFICE O/P EST HI 40 MIN: CPT

## 2022-10-17 NOTE — HISTORY OF PRESENT ILLNESS
[Disease: _____________________] : Disease: [unfilled] [T: ___] : T[unfilled] [N: ___] : N[unfilled] [M: ___] : M[unfilled] [AJCC Stage: ____] : AJCC Stage: [unfilled] [Home] : at home, [unfilled] , at the time of the visit. [Medical Office: (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  Patient had a diagnostic mammo/US on 8/24/21 which showed stable calcification, cyst, fat necrosis but on US finding in left breast left 8:00 retroareolar 7mm circumscribed hypoechoic nodule, seen back in 2017 though slightly larger, 6 mos fu rec for stability. No sonographic correlate retroareolar right breast for blood nipple dc complaint, consider MRI,  BIRADS3.\par \par MRI 8/31/21 showed 2.3cm clumped non-mass enhancement in upper central right breast, MRI bx rec; 8mm non mass enhancement in slightly upper inner right breast, MRI biopsy rec.  No suspicious enhancement in left breast.\par \par MRI biopsy on 9/8/21 revealed right upper central breast with DCIS, low to intermed grade, solid and cribriform type, with calcifications and central necrosis, also involving a radial scar, ER 95%, NE 95%, right upper inner breast biopsy benign with fat necrosis.\par \par Pt underwent a right lumpectomy on 9/28/21 with Dr. 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 Right superior margin with radial sclerosing lesion, right lateral margin DCIS 1mm from inked margin. pT1aNx ER 90%, NE 70%, HER2 neg IHC 0/1+.  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 At that time, Dr. Spears had recommended start letrozole for newly diagnosed Stage I invasive breast cancer (RT was not possible b/c of previous diagnosis).  Patient was reluctant to start letrozole and delayed starting until an unclear date - she believes that once she started she may have taken it for about two months.  During this time, she had joint stiffness and felt like her baseline hair loss was increased.  Of note, patient took letrozole back in 2006 and thought it gave her issues with her eyes, but in retrospect agrees it was unlikely.  During her previous two diagnosis she did not tolerate anastrozole or tamoxifen.  Did not ever take exemestane per patient. \par \par She also has a history of osteoporosis (T -2.7) she was seen by Dr. Pineda who recommended treatment with actonel but patient has not started because of concerns of side effects including reports she read of association with MI.  She is worried about taking calcium supplements because her pcp advised against it. \par Saw Dr. Ramsay and had drainage of seroma x 2, will be seeing again in 3/2023\par Next mammo/US in 8/2022.\par Labs done by PCP \par \par HEALTH MAINTENANCE\par PCP Dr. Clint Pascual, June 2022\par OPHTHO bilateral cataract surgery 9/2020 left, 5/21/21 right eye.  wearing prism glasses for exotropia which is slightly helping. \par GYN last year, no vaginal bleeding or spotting.\par GI no previous colonoscopy.\par DERM rash 2/2 ?bed bugs, now resolved\par walks with walker, difficult ambulating due to vision, wearing prism glasses

## 2022-10-17 NOTE — END OF VISIT
[Time Spent: ___ minutes] : I have spent [unfilled] minutes of time on the encounter. [FreeTextEntry3] : Patient being seen as per physician's primary plan of care.\par D/W Dr. Tay \par

## 2022-10-17 NOTE — PHYSICAL EXAM
[Restricted in physically strenuous activity but ambulatory and able to carry out work of a light or sedentary nature] : Status 1- Restricted in physically strenuous activity but ambulatory and able to carry out work of a light or sedentary nature, e.g., light house work, office work [Normal] : no peripheral adenopathy appreciated [de-identified] : s/p right lumpectomy, surgical scars noted and well healed; 4 cm right breast swelling consistent with known seroma previously drained; no palpable massess b/l ; no adenopathy

## 2022-10-17 NOTE — ASSESSMENT
[FreeTextEntry1] : BRIAN MART  is a 81 year old female here for follow up for right  invasive well differentiated ductal (2 mm) carcinoma with tubular features, ER 90%, NJ 70%, HER2 neg, pT1aNx. s/p right lumpectomy. \par  \par -patient has agreed to retry letrozole x 12 weeks and at that time we will re-evaluate the the role of AI therapy; also could consider exemestane since patient does not believe she ever tried this therapy.  \par - patient was not able to receive RT adjuvantly at time of diagnosis\par - mammo/sono due 8/2022 now scheduled for 12/2022 - RX provided\par - f/up with Dr. Ramsay \par - Spent 45 minutes with patient and her  discussing risks/benefits of adjuvant therapy - will also review with Dr. Tay\par \par Osteoporosis\par -f/up w/ Dr. Pineda for osteoporosis therapy \par - encouraged treatment of osteoporosis regardless of AI therapy \par \par - f/up 3 months\par -I encouraged her to call me with any additional questions.

## 2022-10-31 ENCOUNTER — NON-APPOINTMENT (OUTPATIENT)
Age: 82
End: 2022-10-31

## 2022-12-05 ENCOUNTER — APPOINTMENT (OUTPATIENT)
Dept: ULTRASOUND IMAGING | Facility: CLINIC | Age: 82
End: 2022-12-05

## 2022-12-05 ENCOUNTER — OUTPATIENT (OUTPATIENT)
Dept: OUTPATIENT SERVICES | Facility: HOSPITAL | Age: 82
LOS: 1 days | End: 2022-12-05
Payer: MEDICARE

## 2022-12-05 ENCOUNTER — TRANSCRIPTION ENCOUNTER (OUTPATIENT)
Age: 82
End: 2022-12-05

## 2022-12-05 ENCOUNTER — APPOINTMENT (OUTPATIENT)
Dept: MAMMOGRAPHY | Facility: CLINIC | Age: 82
End: 2022-12-05

## 2022-12-05 DIAGNOSIS — Z98.890 OTHER SPECIFIED POSTPROCEDURAL STATES: Chronic | ICD-10-CM

## 2022-12-05 DIAGNOSIS — Z00.8 ENCOUNTER FOR OTHER GENERAL EXAMINATION: ICD-10-CM

## 2022-12-05 DIAGNOSIS — H26.9 UNSPECIFIED CATARACT: Chronic | ICD-10-CM

## 2022-12-05 PROCEDURE — G0279: CPT

## 2022-12-05 PROCEDURE — G0279: CPT | Mod: 26

## 2022-12-05 PROCEDURE — 77066 DX MAMMO INCL CAD BI: CPT

## 2022-12-05 PROCEDURE — 76641 ULTRASOUND BREAST COMPLETE: CPT

## 2022-12-05 PROCEDURE — 76641 ULTRASOUND BREAST COMPLETE: CPT | Mod: 26,50

## 2022-12-05 PROCEDURE — 77066 DX MAMMO INCL CAD BI: CPT | Mod: 26

## 2022-12-12 ENCOUNTER — OUTPATIENT (OUTPATIENT)
Dept: OUTPATIENT SERVICES | Facility: HOSPITAL | Age: 82
LOS: 1 days | End: 2022-12-12
Payer: MEDICARE

## 2022-12-12 ENCOUNTER — APPOINTMENT (OUTPATIENT)
Dept: MAMMOGRAPHY | Facility: IMAGING CENTER | Age: 82
End: 2022-12-12
Payer: MEDICARE

## 2022-12-12 DIAGNOSIS — Z00.8 ENCOUNTER FOR OTHER GENERAL EXAMINATION: ICD-10-CM

## 2022-12-12 DIAGNOSIS — Z98.890 OTHER SPECIFIED POSTPROCEDURAL STATES: Chronic | ICD-10-CM

## 2022-12-12 DIAGNOSIS — H26.9 UNSPECIFIED CATARACT: Chronic | ICD-10-CM

## 2022-12-12 PROCEDURE — 88305 TISSUE EXAM BY PATHOLOGIST: CPT

## 2022-12-12 PROCEDURE — 88360 TUMOR IMMUNOHISTOCHEM/MANUAL: CPT

## 2022-12-12 PROCEDURE — A4648: CPT

## 2022-12-12 PROCEDURE — 88360 TUMOR IMMUNOHISTOCHEM/MANUAL: CPT | Mod: 26

## 2022-12-12 PROCEDURE — 88305 TISSUE EXAM BY PATHOLOGIST: CPT | Mod: 26

## 2022-12-12 PROCEDURE — 77065 DX MAMMO INCL CAD UNI: CPT

## 2022-12-12 PROCEDURE — 19082 BX BREAST ADD LESION STRTCTC: CPT | Mod: LT

## 2022-12-12 PROCEDURE — 77066 DX MAMMO INCL CAD BI: CPT | Mod: 26

## 2022-12-12 PROCEDURE — 19081 BX BREAST 1ST LESION STRTCTC: CPT

## 2022-12-12 PROCEDURE — 19081 BX BREAST 1ST LESION STRTCTC: CPT | Mod: RT

## 2022-12-16 LAB — SURGICAL PATHOLOGY STUDY: SIGNIFICANT CHANGE UP

## 2022-12-29 ENCOUNTER — APPOINTMENT (OUTPATIENT)
Dept: MRI IMAGING | Facility: IMAGING CENTER | Age: 82
End: 2022-12-29
Payer: MEDICARE

## 2022-12-29 ENCOUNTER — OUTPATIENT (OUTPATIENT)
Dept: OUTPATIENT SERVICES | Facility: HOSPITAL | Age: 82
LOS: 1 days | End: 2022-12-29
Payer: MEDICARE

## 2022-12-29 DIAGNOSIS — Z00.8 ENCOUNTER FOR OTHER GENERAL EXAMINATION: ICD-10-CM

## 2022-12-29 DIAGNOSIS — Z98.890 OTHER SPECIFIED POSTPROCEDURAL STATES: Chronic | ICD-10-CM

## 2022-12-29 DIAGNOSIS — H26.9 UNSPECIFIED CATARACT: Chronic | ICD-10-CM

## 2022-12-29 PROCEDURE — 77049 MRI BREAST C-+ W/CAD BI: CPT | Mod: 26,MH

## 2022-12-29 PROCEDURE — C8908: CPT | Mod: MH

## 2022-12-29 PROCEDURE — C8937: CPT

## 2022-12-29 PROCEDURE — A9585: CPT

## 2023-01-05 ENCOUNTER — APPOINTMENT (OUTPATIENT)
Dept: HEMATOLOGY ONCOLOGY | Facility: CLINIC | Age: 83
End: 2023-01-05

## 2023-01-09 ENCOUNTER — OUTPATIENT (OUTPATIENT)
Dept: OUTPATIENT SERVICES | Facility: HOSPITAL | Age: 83
LOS: 1 days | End: 2023-01-09
Payer: MEDICARE

## 2023-01-09 VITALS
HEART RATE: 73 BPM | WEIGHT: 149.03 LBS | HEIGHT: 65 IN | RESPIRATION RATE: 16 BRPM | SYSTOLIC BLOOD PRESSURE: 163 MMHG | OXYGEN SATURATION: 98 % | DIASTOLIC BLOOD PRESSURE: 90 MMHG | TEMPERATURE: 97 F

## 2023-01-09 DIAGNOSIS — H26.9 UNSPECIFIED CATARACT: Chronic | ICD-10-CM

## 2023-01-09 DIAGNOSIS — I10 ESSENTIAL (PRIMARY) HYPERTENSION: ICD-10-CM

## 2023-01-09 DIAGNOSIS — Z98.890 OTHER SPECIFIED POSTPROCEDURAL STATES: Chronic | ICD-10-CM

## 2023-01-09 DIAGNOSIS — D05.12 INTRADUCTAL CARCINOMA IN SITU OF LEFT BREAST: ICD-10-CM

## 2023-01-09 DIAGNOSIS — F41.9 ANXIETY DISORDER, UNSPECIFIED: ICD-10-CM

## 2023-01-09 LAB
ALBUMIN SERPL ELPH-MCNC: 4.6 G/DL — SIGNIFICANT CHANGE UP (ref 3.3–5)
ALP SERPL-CCNC: 83 U/L — SIGNIFICANT CHANGE UP (ref 40–120)
ALT FLD-CCNC: 10 U/L — SIGNIFICANT CHANGE UP (ref 4–33)
ANION GAP SERPL CALC-SCNC: 9 MMOL/L — SIGNIFICANT CHANGE UP (ref 7–14)
AST SERPL-CCNC: 13 U/L — SIGNIFICANT CHANGE UP (ref 4–32)
BILIRUB SERPL-MCNC: 0.6 MG/DL — SIGNIFICANT CHANGE UP (ref 0.2–1.2)
BUN SERPL-MCNC: 11 MG/DL — SIGNIFICANT CHANGE UP (ref 7–23)
CALCIUM SERPL-MCNC: 9.7 MG/DL — SIGNIFICANT CHANGE UP (ref 8.4–10.5)
CHLORIDE SERPL-SCNC: 103 MMOL/L — SIGNIFICANT CHANGE UP (ref 98–107)
CO2 SERPL-SCNC: 28 MMOL/L — SIGNIFICANT CHANGE UP (ref 22–31)
CREAT SERPL-MCNC: 0.99 MG/DL — SIGNIFICANT CHANGE UP (ref 0.5–1.3)
EGFR: 57 ML/MIN/1.73M2 — LOW
GLUCOSE SERPL-MCNC: 78 MG/DL — SIGNIFICANT CHANGE UP (ref 70–99)
HCT VFR BLD CALC: 44 % — SIGNIFICANT CHANGE UP (ref 34.5–45)
HGB BLD-MCNC: 14.2 G/DL — SIGNIFICANT CHANGE UP (ref 11.5–15.5)
MCHC RBC-ENTMCNC: 29.1 PG — SIGNIFICANT CHANGE UP (ref 27–34)
MCHC RBC-ENTMCNC: 32.3 GM/DL — SIGNIFICANT CHANGE UP (ref 32–36)
MCV RBC AUTO: 90.2 FL — SIGNIFICANT CHANGE UP (ref 80–100)
NRBC # BLD: 0 /100 WBCS — SIGNIFICANT CHANGE UP (ref 0–0)
NRBC # FLD: 0 K/UL — SIGNIFICANT CHANGE UP (ref 0–0)
PLATELET # BLD AUTO: 294 K/UL — SIGNIFICANT CHANGE UP (ref 150–400)
POTASSIUM SERPL-MCNC: 4.1 MMOL/L — SIGNIFICANT CHANGE UP (ref 3.5–5.3)
POTASSIUM SERPL-SCNC: 4.1 MMOL/L — SIGNIFICANT CHANGE UP (ref 3.5–5.3)
PROT SERPL-MCNC: 8 G/DL — SIGNIFICANT CHANGE UP (ref 6–8.3)
RBC # BLD: 4.88 M/UL — SIGNIFICANT CHANGE UP (ref 3.8–5.2)
RBC # FLD: 13.2 % — SIGNIFICANT CHANGE UP (ref 10.3–14.5)
SODIUM SERPL-SCNC: 140 MMOL/L — SIGNIFICANT CHANGE UP (ref 135–145)
WBC # BLD: 7.31 K/UL — SIGNIFICANT CHANGE UP (ref 3.8–10.5)
WBC # FLD AUTO: 7.31 K/UL — SIGNIFICANT CHANGE UP (ref 3.8–10.5)

## 2023-01-09 PROCEDURE — 93010 ELECTROCARDIOGRAM REPORT: CPT

## 2023-01-09 RX ORDER — DIAZEPAM 5 MG
0 TABLET ORAL
Qty: 0 | Refills: 0 | DISCHARGE

## 2023-01-09 RX ORDER — SODIUM CHLORIDE 9 MG/ML
1000 INJECTION, SOLUTION INTRAVENOUS
Refills: 0 | Status: DISCONTINUED | OUTPATIENT
Start: 2023-01-24 | End: 2023-02-07

## 2023-01-09 RX ORDER — ACETAMINOPHEN 500 MG
0 TABLET ORAL
Qty: 0 | Refills: 0 | DISCHARGE

## 2023-01-09 NOTE — H&P PST ADULT - NEGATIVE ENMT SYMPTOMS
no ear pain/no tinnitus/no vertigo/no sinus symptoms/no nasal obstruction/no nose bleeds/no throat pain/no dysphagia

## 2023-01-09 NOTE — H&P PST ADULT - PROBLEM SELECTOR PLAN 1
Patient tentatively scheduled for left partial mastectomy with seed localization for 1/24/23. Pre-op instructions provided. Pt given verbal and written instructions with teach back on chlorhexidine shampoo. Pt verbalized understanding with return demonstration.     Pt instructed to obtain a COVID-19 PCR 3-5 days prior to the procedure. COVID-19 PCR order placed. Pt stated understanding.     81 y/o female with uncontrolled HTN scheduled for a low risk procedure. Pt advised to obtain a medical evaluation. Form given. Surgeon notified via email.

## 2023-01-09 NOTE — H&P PST ADULT - PROBLEM/PLAN-3
Pt came in d/t stitches being removed. Pt states she had a mole removed to left forearm. Pt has open lac- bleeding controlled dressing applied   DISPLAY PLAN FREE TEXT

## 2023-01-09 NOTE — H&P PST ADULT - NSICDXPASTMEDICALHX_GEN_ALL_CORE_FT
PAST MEDICAL HISTORY:  Anxiety     Breast cancer 2006  Left breast tx surgically s/p RT , right Breast 2013 ; tx surgically s/p RT       Goiter per pt monitored by pcp    HTN (hypertension)     Hyperlipidemia

## 2023-01-09 NOTE — H&P PST ADULT - HISTORY OF PRESENT ILLNESS
81 y/o female with h/o breast cancer s/p Left lumpectomy in 2006 s/p radiation and arimidex/tamoxifen, and Right lumpectomy in 2013 s/p radiation and 2021 presents to presurgical testing with diagnosis of intraductal carcinoma in situ of left breast scheduled for a left partial mastectomy with seed localization. Pt with abnormal mammogram and biopsy of left breast in 12/2021.

## 2023-01-12 ENCOUNTER — APPOINTMENT (OUTPATIENT)
Dept: MRI IMAGING | Facility: IMAGING CENTER | Age: 83
End: 2023-01-12
Payer: MEDICARE

## 2023-01-12 ENCOUNTER — OUTPATIENT (OUTPATIENT)
Dept: OUTPATIENT SERVICES | Facility: HOSPITAL | Age: 83
LOS: 1 days | End: 2023-01-12
Payer: MEDICARE

## 2023-01-12 DIAGNOSIS — H26.9 UNSPECIFIED CATARACT: Chronic | ICD-10-CM

## 2023-01-12 DIAGNOSIS — Z00.8 ENCOUNTER FOR OTHER GENERAL EXAMINATION: ICD-10-CM

## 2023-01-12 DIAGNOSIS — Z98.890 OTHER SPECIFIED POSTPROCEDURAL STATES: Chronic | ICD-10-CM

## 2023-01-12 PROBLEM — I10 ESSENTIAL (PRIMARY) HYPERTENSION: Chronic | Status: ACTIVE | Noted: 2023-01-09

## 2023-01-12 PROCEDURE — 88360 TUMOR IMMUNOHISTOCHEM/MANUAL: CPT | Mod: 26

## 2023-01-12 PROCEDURE — 88305 TISSUE EXAM BY PATHOLOGIST: CPT | Mod: 26

## 2023-01-12 PROCEDURE — 77065 DX MAMMO INCL CAD UNI: CPT | Mod: 26,LT

## 2023-01-12 PROCEDURE — 77065 DX MAMMO INCL CAD UNI: CPT

## 2023-01-12 PROCEDURE — A9585: CPT

## 2023-01-12 PROCEDURE — 19085 BX BREAST 1ST LESION MR IMAG: CPT | Mod: LT

## 2023-01-12 PROCEDURE — 88305 TISSUE EXAM BY PATHOLOGIST: CPT

## 2023-01-12 PROCEDURE — A4648: CPT

## 2023-01-12 PROCEDURE — 19085 BX BREAST 1ST LESION MR IMAG: CPT

## 2023-01-12 PROCEDURE — 88360 TUMOR IMMUNOHISTOCHEM/MANUAL: CPT

## 2023-01-17 LAB — SURGICAL PATHOLOGY STUDY: SIGNIFICANT CHANGE UP

## 2023-01-19 ENCOUNTER — APPOINTMENT (OUTPATIENT)
Dept: MAMMOGRAPHY | Facility: IMAGING CENTER | Age: 83
End: 2023-01-19
Payer: MEDICARE

## 2023-01-19 ENCOUNTER — OUTPATIENT (OUTPATIENT)
Dept: OUTPATIENT SERVICES | Facility: HOSPITAL | Age: 83
LOS: 1 days | End: 2023-01-19
Payer: MEDICARE

## 2023-01-19 DIAGNOSIS — Z00.8 ENCOUNTER FOR OTHER GENERAL EXAMINATION: ICD-10-CM

## 2023-01-19 DIAGNOSIS — H26.9 UNSPECIFIED CATARACT: Chronic | ICD-10-CM

## 2023-01-19 DIAGNOSIS — Z98.890 OTHER SPECIFIED POSTPROCEDURAL STATES: Chronic | ICD-10-CM

## 2023-01-19 PROCEDURE — 19282 PERQ DEVICE BREAST EA IMAG: CPT | Mod: LT

## 2023-01-19 PROCEDURE — C1889: CPT

## 2023-01-19 PROCEDURE — 19282 PERQ DEVICE BREAST EA IMAG: CPT

## 2023-01-19 PROCEDURE — 19281 PERQ DEVICE BREAST 1ST IMAG: CPT

## 2023-01-19 PROCEDURE — 19281 PERQ DEVICE BREAST 1ST IMAG: CPT | Mod: LT

## 2023-01-23 NOTE — ASU PATIENT PROFILE, ADULT - NSICDXPASTSURGICALHX_GEN_ALL_CORE_FT
PAST SURGICAL HISTORY:  Bilateral cataracts Left 11/2020, Right 5/21    H/O lumpectomy Right breast 2013    History of tonsillectomy as a child

## 2023-01-23 NOTE — ASU PATIENT PROFILE, ADULT - TEACHING/LEARNING DEVELOPMENTAL CONSIDERATIONS
[address] [facility]  EMERGENCY DEPARTMENT HISTORY AND PHYSICAL EXAM       Date: 4/7/2020   Patient Name: Phyllis Christine   YOB: 1960  Medical Record Number: [id number]    HISTORY OF PRESENTING ILLNESS:     Phyllis Christine is a 61 y.o. female presenting with the noted PMH to the ED c/o vaginal itching. Patient states it started yesterday. She states she started having vaginal itching and after she been itching it for a while she started having burning. She states it started feeling better today. But then she started itching and the burning came back. She states it is worse now when she tries to urinate. Also worse when she tries to use the Monistat. She states she went to the pharmacy yesterday and they told her to use Monistat 3. She states after she inserted yesterday it made it worse. And then when she started today it made it worse. And is not helping. She states she is not take any pain medicines at home. She denies any chest pain or shortness of breath. Denies any fevers or chills. Reports mild nausea earlier yesterday but no vomiting. She states she is also had increased stools today. She is had 4 episodes of stools today. Slightly looser than normal.  She denies any cough or cold symptoms. Denies any travel or trauma. Denies any sick contacts. Rest of 10 systems reviewed and negative.     Primary Care Provider: Alyce Gastelum MD   Specialist:    Past Medical History:   Past Medical History:   Diagnosis Date    Abdominal adhesions     Adnexal cyst 7/30/2019    Left    Anemia     Asthma     Chronic pelvic pain in female 7/30/2019    Diabetes mellitus     Dyskinesia     bilateral    Essential hypertension     GERD (gastroesophageal reflux disease)     Hypertension     Menopause     Microhematuria     [facility]    Stool color black         Past Surgical History:   Past Surgical History:   Procedure Laterality Date    COLONOSCOPY N/A 1/21/2019    COLONOSCOPY performed by Chilo Garvin MD at [address] HX CHOLECYSTECTOMY  6/28/10    HX COLONOSCOPY      HX ENDOSCOPY      HX HERNIA REPAIR      HX HYSTERECTOMY  1989    Fibroids    HX KNEE REPLACEMENT Left     HX KNEE REPLACEMENT Right 06/2018    HX OOPHORECTOMY  12/2000    HX ORTHOPAEDIC Right     ankle- multiple surgeries    HX SMALL BOWEL RESECTION  12/2000    HX SMALL BOWEL RESECTION  02/21/2017    Dr. Rita Helton          Social History:   Social History     Tobacco Use    Smoking status: Never Smoker    Smokeless tobacco: Never Used   Substance Use Topics    Alcohol use: No    Drug use: No        Allergies: Allergies   Allergen Reactions    Macrodantin [Nitrofurantoin Macrocrystalline] Itching and Other (comments)    Tape [Adhesive] Other (comments)     Paper tape-- feels like burning skin  Burned skin when had wound vac        REVIEW OF SYSTEMS:  Review of Systems      PHYSICAL EXAM:  Vitals:    04/07/20 2007   BP: (!) 179/111   Pulse: 94   Resp: 16   Temp: 99.3 °F (37.4 °C)   SpO2: 98%   Weight: 68.9 kg (152 lb)   Height: 5' 4\" (1.626 m)       Physical Exam   Vital signs reviewed. Alert oriented x 3 in NAD. HEENT: normocephalic atraumatic. Eyes are PERRLA EOMI. Conjunctiva normal.    External ears and nose normal.    Neck: normal external exam. No midline neck or back TTP. Lungs are clear to ascultation bilaterally. normal effort  Heart is regular rate and rhythm with no murmurs. Abdomen soft and nontender. No rebound rigidity or guarding. Extremities: Moves all 4 extremities and no distress. Full range of motion. 2+ pulses and BCR in all 4 extremities. Neuro: Normal gait. 5 out of 5 strength in all 4 extremities. No facial droop. Skin examination: intact. no rashes. No petechia or purpura.       Medications   fluconazole (DIFLUCAN) tablet 200 mg (200 mg Oral Given 4/7/20 2114) HYDROcodone-acetaminophen (NORCO) 5-325 mg per tablet 1 Tab (1 Tab Oral Given 4/7/20 2114)       RESULTS:    Labs -   Labs Reviewed   URINALYSIS W/ RFLX MICROSCOPIC - Abnormal; Notable for the following components:       Result Value    Glucose >1,000 (*)     Leukocyte Esterase SMALL (*)     All other components within normal limits   CULTURE, URINE   URINE MICROSCOPIC ONLY       Radiologic Studies -  No results found. MEDICAL DECISION MAKING    Patient is diabetic with increased risk of vulvovaginal candidiasis. Will cover with pain meds and Diflucan. Urinalysis pending. Patient is postmenopausal s/p hysterectomy. No signs of pregnancy or ectopic pregnancy. Negative Feldman's. Negative Rogers's. No signs or symptoms of acute appendicitis cholecystitis or pancreatitis. No sepsis. No fevers. Reviewed CT from 3/2020. Patient with positive leukocyte esterase however patient has history of C. difficile. Urine culture pending. Will wait for culture before starting antibiotics since having 4 stools loose today and having history of C. difficile in the past..  Will cover with symptomatic medications and for yeast and precautions explained. No evidence of ketones in her urine. Unlikely DKA. Patient states understanding and agrees with plan.     2135. Pt reassessed. Received the medication. Helping. Results and precautions explained. Patient agrees with plan. Patient has no new complaints, changes, or physical findings. Results were reviewed with the patient. Pt's questions and concerns were addressed. Care plan was outlined, including follow-up with PCP/specialist and return precautions were discussed. Patient is felt to be stable for discharge at this time. Diagnosis   Clinical Impression:   1.  Vaginitis and vulvovaginitis           Follow-up Information     Follow up With Specialties Details Why Contact Info    Sofía Pimentel MD Family Practice, Internal Medicine Call in 1 day  [phone] [facility]  [address]      Cristopher Smith MD Obstetrics & Gynecology Call in 1 day  [address]  [phone]            Current Discharge Medication List           discharged in stable and improved condition. This chart was completed using Dragon, a dictation transcription service. Errors may have resulted from using this device. none

## 2023-01-23 NOTE — ASU PATIENT PROFILE, ADULT - ALCOHOL USE HISTORY SINGLE SELECT
never Pt has chronic elevated risk of harm to self and others given her substance abuse and chronic depressed mood. However, she is not currently at acute elevated risk of harm to self or others. Risk factors include depressed mood, possible suicidal ideation, chronic pain, insomnia, unstable marriage, impulsivity. Protective factors include lack of suicidal ideation, intent, and plan, denies suicidal intent associated with BDZ abuse, good social supports, stability of domicile, future oriented, identifies reasons to live, currently in treatment with psychiatrist/therapist. Though patient would benefit from substance abuse treatment, she currently is refusing and she does not want voluntary psychiatric admission at this time. The pt does not meet criteria for inpatient involuntary psychiatric admission. The patient will be provided with options for rehab, and will be discharged home today. Patient's son does not have safety concerns at this time. Pt is able to contract for safety, and is agreeable with discharge plan. Pt has chronic elevated risk of harm to self and others given her substance abuse and chronic depressed mood. However, she is not currently at acute elevated risk of harm to self or others. Risk factors include depressed mood, possible suicidal ideation, chronic pain, insomnia, unstable marriage, impulsivity. Protective factors include lack of suicidal ideation, intent, and plan, denies suicidal intent associated with BDZ abuse, good social supports, stability of domicile, future oriented, identifies reasons to live, currently in treatment with psychiatrist/therapist. Though patient would benefit from substance abuse treatment, she currently is refusing and she does not want voluntary psychiatric admission at this time. The pt does not meet criteria for inpatient involuntary psychiatric admission. The patient will be provided with options for rehab, and will be discharged home today. Patient's son does not have safety concerns at this time related to patient's psychiatric condition. Pt is able to contract for safety, and is agreeable with discharge plan.

## 2023-01-24 ENCOUNTER — OUTPATIENT (OUTPATIENT)
Dept: OUTPATIENT SERVICES | Facility: HOSPITAL | Age: 83
LOS: 1 days | Discharge: HOME | End: 2023-01-24
Payer: MEDICARE

## 2023-01-24 ENCOUNTER — TRANSCRIPTION ENCOUNTER (OUTPATIENT)
Age: 83
End: 2023-01-24

## 2023-01-24 ENCOUNTER — APPOINTMENT (OUTPATIENT)
Dept: SURGERY | Facility: HOSPITAL | Age: 83
End: 2023-01-24

## 2023-01-24 VITALS
TEMPERATURE: 98 F | HEART RATE: 73 BPM | WEIGHT: 149.03 LBS | DIASTOLIC BLOOD PRESSURE: 77 MMHG | RESPIRATION RATE: 18 BRPM | OXYGEN SATURATION: 100 % | SYSTOLIC BLOOD PRESSURE: 154 MMHG | HEIGHT: 65 IN

## 2023-01-24 VITALS
RESPIRATION RATE: 15 BRPM | OXYGEN SATURATION: 97 % | DIASTOLIC BLOOD PRESSURE: 58 MMHG | TEMPERATURE: 97 F | SYSTOLIC BLOOD PRESSURE: 126 MMHG | HEART RATE: 63 BPM

## 2023-01-24 DIAGNOSIS — D05.12 INTRADUCTAL CARCINOMA IN SITU OF LEFT BREAST: ICD-10-CM

## 2023-01-24 DIAGNOSIS — Z98.890 OTHER SPECIFIED POSTPROCEDURAL STATES: Chronic | ICD-10-CM

## 2023-01-24 DIAGNOSIS — H26.9 UNSPECIFIED CATARACT: Chronic | ICD-10-CM

## 2023-01-24 PROCEDURE — 76098 X-RAY EXAM SURGICAL SPECIMEN: CPT | Mod: 26

## 2023-01-24 PROCEDURE — 19301K: CUSTOM | Mod: LT

## 2023-01-24 PROCEDURE — 88307 TISSUE EXAM BY PATHOLOGIST: CPT | Mod: 26

## 2023-01-24 PROCEDURE — 19126 EXCISION ADDL BREAST LESION: CPT

## 2023-01-24 PROCEDURE — 19125 EXCISION BREAST LESION: CPT | Mod: LT

## 2023-01-24 PROCEDURE — 88305 TISSUE EXAM BY PATHOLOGIST: CPT | Mod: 26

## 2023-01-24 RX ORDER — FENTANYL CITRATE 50 UG/ML
25 INJECTION INTRAVENOUS
Refills: 0 | Status: DISCONTINUED | OUTPATIENT
Start: 2023-01-24 | End: 2023-01-24

## 2023-01-24 RX ORDER — CHOLECALCIFEROL (VITAMIN D3) 125 MCG
1 CAPSULE ORAL
Qty: 0 | Refills: 0 | DISCHARGE

## 2023-01-24 RX ORDER — DIAZEPAM 5 MG
1 TABLET ORAL
Qty: 0 | Refills: 0 | DISCHARGE

## 2023-01-24 RX ORDER — ACETAMINOPHEN 500 MG
2 TABLET ORAL
Qty: 0 | Refills: 0 | DISCHARGE

## 2023-01-24 RX ORDER — PREGABALIN 225 MG/1
1 CAPSULE ORAL
Qty: 0 | Refills: 0 | DISCHARGE

## 2023-01-24 RX ORDER — OXYCODONE HYDROCHLORIDE 5 MG/1
5 TABLET ORAL ONCE
Refills: 0 | Status: DISCONTINUED | OUTPATIENT
Start: 2023-01-24 | End: 2023-01-24

## 2023-01-24 RX ORDER — AMLODIPINE BESYLATE 2.5 MG/1
1 TABLET ORAL
Qty: 0 | Refills: 0 | DISCHARGE

## 2023-01-24 RX ADMIN — FENTANYL CITRATE 25 MICROGRAM(S): 50 INJECTION INTRAVENOUS at 11:00

## 2023-01-24 NOTE — ASU DISCHARGE PLAN (ADULT/PEDIATRIC) - NURSING INSTRUCTIONS
DO NOT take any Tylenol (Acetaminophen) or narcotics containing Tylenol until after  3pm . You received Tylenol during your operation and it can cause damage to your liver if too much is taken within a 24 hour time period.

## 2023-01-24 NOTE — ASU DISCHARGE PLAN (ADULT/PEDIATRIC) - FOLLOW UP APPOINTMENTS
may also call Recovery Room (PACU) 24/7 @ [phone]/[facility]

## 2023-01-24 NOTE — ASU DISCHARGE PLAN (ADULT/PEDIATRIC) - CARE PROVIDER_API CALL
Cassandra Ramsay)  [facility]  [address], [address]  [place], [place] [id number]  Phone: [phone]  Fax: [phone]  Follow Up Time:

## 2023-01-24 NOTE — ASU DISCHARGE PLAN (ADULT/PEDIATRIC) - NS MD DC FALL RISK RISK
For information on Fall & Injury Prevention, visit: [email or web address] OR  [email or web address] OR  https://www.cdc.gov/steadi/patient.html

## 2023-01-24 NOTE — ASU DISCHARGE PLAN (ADULT/PEDIATRIC) - ASU DC SPECIAL INSTRUCTIONSFT
WOUND CARE:   BATHING: Please do not submerge wound underwater. You may shower and/or sponge bathe.  ACTIVITY: No heavy lifting or straining. Otherwise, you may return to your usual level of physical activity. If you are taking narcotic pain medication (such as Percocet), do NOT drive a car, operate machinery or make important decisions.  DIET: Return to your usual diet.  NOTIFY YOUR SURGEON IF: You have any bleeding that does not stop, any pus draining from your wound, any fever (over 100.4 F) or chills or if your pain is not controlled on your discharge pain medications.  FOLLOW-UP:  1. Follow-up with Dr. Ramsay within 1-2 weeks of discharge.  Please call office for appointment  2. Please follow up with your primary care physician in one week regarding your hospitalization.

## 2023-01-27 LAB — SURGICAL PATHOLOGY STUDY: SIGNIFICANT CHANGE UP

## 2023-01-30 ENCOUNTER — APPOINTMENT (OUTPATIENT)
Dept: SURGERY | Facility: CLINIC | Age: 83
End: 2023-01-30
Payer: MEDICARE

## 2023-01-30 PROCEDURE — 99024 POSTOP FOLLOW-UP VISIT: CPT

## 2023-02-06 ENCOUNTER — NON-APPOINTMENT (OUTPATIENT)
Age: 83
End: 2023-02-06

## 2023-02-06 ENCOUNTER — OUTPATIENT (OUTPATIENT)
Dept: OUTPATIENT SERVICES | Facility: HOSPITAL | Age: 83
LOS: 1 days | Discharge: HOME | End: 2023-02-06

## 2023-02-06 DIAGNOSIS — H26.9 UNSPECIFIED CATARACT: Chronic | ICD-10-CM

## 2023-02-06 DIAGNOSIS — Z98.890 OTHER SPECIFIED POSTPROCEDURAL STATES: Chronic | ICD-10-CM

## 2023-02-06 DIAGNOSIS — C50.919 MALIGNANT NEOPLASM OF UNSPECIFIED SITE OF UNSPECIFIED FEMALE BREAST: ICD-10-CM

## 2023-02-09 ENCOUNTER — APPOINTMENT (OUTPATIENT)
Dept: HEMATOLOGY ONCOLOGY | Facility: CLINIC | Age: 83
End: 2023-02-09
Payer: MEDICARE

## 2023-02-09 VITALS
DIASTOLIC BLOOD PRESSURE: 73 MMHG | WEIGHT: 149.91 LBS | RESPIRATION RATE: 16 BRPM | SYSTOLIC BLOOD PRESSURE: 148 MMHG | BODY MASS INDEX: 24.2 KG/M2 | HEART RATE: 63 BPM | OXYGEN SATURATION: 99 % | TEMPERATURE: 97.8 F

## 2023-02-09 PROCEDURE — 99215 OFFICE O/P EST HI 40 MIN: CPT

## 2023-02-09 RX ORDER — LETROZOLE TABLETS 2.5 MG/1
2.5 TABLET, FILM COATED ORAL
Qty: 90 | Refills: 1 | Status: DISCONTINUED | COMMUNITY
Start: 2021-10-26 | End: 2023-02-09

## 2023-02-09 RX ORDER — LETROZOLE TABLETS 2.5 MG/1
2.5 TABLET, FILM COATED ORAL
Qty: 90 | Refills: 3 | Status: DISCONTINUED | COMMUNITY
Start: 2023-01-30 | End: 2023-02-09

## 2023-02-12 NOTE — REVIEW OF SYSTEMS
[Joint Stiffness] : joint stiffness [Vision Problems] : vision problems [Negative] : ENT [FreeTextEntry3] : using prism glasses [de-identified] : stress -  has been fery ill

## 2023-02-12 NOTE — CONSULT LETTER
[Dear  ___] : Dear  [unfilled], [Courtesy Letter:] : I had the pleasure of seeing your patient, [unfilled], in my office today. [Consult Closing:] : Thank you very much for allowing me to participate in the care of this patient.  If you have any questions, please do not hesitate to contact me. [Sincerely,] : Sincerely, [FreeTextEntry1] : As you know 82 year old female with prior history of Left breast cancer in 2006 s/p RT and several trials of endocrine therapy (stopped anastrazole due to joint pains after 8 months; stopped tamoxifen after 1 year due to endometrial lining thickening); s/p RIght breast DCIS in 11/2013 s/p lumpectomy ER40%, VA 30%; again  recurrent disease in 9/2021 with right breast invasive well differentiated ductal (2 mm) carcinoma with tubular features, ER 90%, VA 70%, HER2 neg, pT1aNx. s/p right lumpectomy (no radiation due to prior radiation). Was prescribed endocrine therapy thereafter but unable to tolerate due to joint issues despite multiple attempts for trials with different agents\par \par Most recently developed DCIS of left breast s/p lumpectomy on 1/24/23\par - will request pathology reports from [facility] \par - she was prescribed letrazole again by your office; she has still not taken medication yet due to persistent concerns about side effects\par - she is not a candidate for radiation therapy due to prior radiation\par - I discussed need for starting endocrine therapy; advised that she can start taking letrazole as she has prescription from your office; alternatively, if she is concerned about joint aches which she previously experienced with letrazole, offered option of exemestane 25mg daily as it is steroid based and she may have less side effects with it; prescription was sent to pharmacy of her choice.\par - she will follow up with me in about 3 months\par  [FreeTextEntry3] : Rene Tay MD\par \par Division of Hematology/Oncology\par [facility]\par [address]\[place] [address] \par Tel: [phone]\par Fax: [phone]\par Email: [email or web address]\par

## 2023-02-12 NOTE — ASSESSMENT
[FreeTextEntry1] : BRIAN MART  is a 82 year old female with prior history of Left breast cancer in 2006 s/p RT and several trials of endocrine therapy (stopped anastrazole due to joint pains after 8 months; stopped tamoxifen after 1 year due to endometrial lining thickening); s/p RIght breast DCIS in 11/2013 s/p lumpectomy ER40%, AR 30%; again  recurrent disease in 9/2021 with right breast invasive well differentiated ductal (2 mm) carcinoma with tubular features, ER 90%, AR 70%, HER2 neg, pT1aNx. s/p right lumpectomy (no radiation due to prior radiation). Was prescribed endocrine therapy thereafter but unable to tolerate due to joint issues despite multiple attempts for trials with different agents\par \par Most recently developed DCIS of left breast s/p lumpectomy on 1/24/23\par - will request pathology reports from [facility] and/or Dr. Ramsay's office\par - she was prescribed letrazole again by Dr. Ramsay this time; has still not taken medication yet due to persistent concerns about side effects\par - not a candidate for radiation therapy due to prior radiation\par - discussed need for starting endocrine therapy; advised that she can start taking letrazole as she has prescription from Dr. Ramsay; alternatively, if she is concerned about joint aches which she previously experienced with letrazole, offered option of exemestane 25mg daily as it is steroid based and she may have less side effects with it\par - encouraged f/up with Dr. Ramsay as scheduled\par - Patient had the opportunity to have all their questions answered to their satisfaction \par \par Osteoporosis\par -f/up w/ Dr. Pineda for osteoporosis therapy \par - encouraged treatment of osteoporosis regardless of AI therapy \par \par - f/up 3 months\par

## 2023-02-12 NOTE — HISTORY OF PRESENT ILLNESS
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  Patient had a diagnostic mammo/US on 8/24/21 which showed stable calcification, cyst, fat necrosis but on US finding in left breast left 8:00 retroareolar 7mm circumscribed hypoechoic nodule, seen back in 2017 though slightly larger, 6 mos fu rec for stability. No sonographic correlate retroareolar right breast for blood nipple dc complaint, consider MRI,  BIRADS3.\par \par MRI 8/31/21 showed 2.3cm clumped non-mass enhancement in upper central right breast, MRI bx rec; 8mm non mass enhancement in slightly upper inner right breast, MRI biopsy rec.  No suspicious enhancement in left breast.\par \par MRI biopsy on 9/8/21 revealed right upper central breast with DCIS, low to intermed grade, solid and cribriform type, with calcifications and central necrosis, also involving a radial scar, ER 95%, MA 95%, right upper inner breast biopsy benign with fat necrosis.\par \par Pt underwent a right lumpectomy on 9/28/21 with Dr. 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 Right superior margin with radial sclerosing lesion, right lateral margin DCIS 1mm from inked margin. pT1aNx ER 90%, MA 70%, HER2 neg IHC 0/1+.  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 Pineda who recommended treatment with actonel but patient has not started because of concerns of side effects including reports she read of association with MI.  She is worried about taking calcium supplements because her pcp advised against it. \par \par Saw Dr. Ramsay and had drainage of seroma x 2 in 2022\par \par Mammo/sono  12/5/2022 \par New amorphous calcifications at the right lumpectomy bed and indeterminant linear calcifications in the left upper outer posterior breast. Also with new asymmetry in the right upper breast and asymmetry in the central left breast, both without ultrasound correlate for which six-month follow-up with mammogram is recommended, assuming the recommended biopsies are returned as benign.\par \par Stereotactic Biopsies 12/12/22\par Right breast - benign\par Left upper outer quad - DCIS low nuclear grade solid and cribiform types, ER90%, KY 40%\par \par MRI breast pre-operative 12/29/222\par No suspicious findings in right breast\par Left breast lower central 6 mm enhancing focus ([id number]). Recommending  MRI guided core needle biopsy for tissue sampling.\par Upper central left breast biopsy proven DCIS with 1.8 cm associated enhancement, recommend wide surgical excision to include area of enhancement.\par Post lumpectomy changes in the right breast. Post biopsy changes with pathology proven benign enhancement in the right central breast.\par No suspicious lymphadenopathy.\par \par MRI guided biopsy 1/12/23\par Left breast 6mm lesion in lower central- DCIS solid and cribiform with intermediate grade nuclear atypia - ER90%, KY 90%\par \par Lumpectory 1/24/23\par Single focus of DCIS; all margins clear\par \par HEALTH MAINTENANCE\par PCP Dr. Clint Pascual, June 2022\par OPHTHO bilateral cataract surgery 9/2020 left, 5/21/21 right eye.  wearing prism glasses for exotropia which is slightly helping. \par GYN last year, no vaginal bleeding or spotting.\par GI no previous colonoscopy.\par DERM rash 2/2 ?bed bugs, now resolved\par walks with walker, difficult ambulating due to vision, wearing prism glasses [90: Able to carry normal activity; minor signs or symptoms of disease.] : 90: Able to carry normal activity; minor signs or symptoms of disease.

## 2023-02-12 NOTE — PHYSICAL EXAM
[Restricted in physically strenuous activity but ambulatory and able to carry out work of a light or sedentary nature] : Status 1- Restricted in physically strenuous activity but ambulatory and able to carry out work of a light or sedentary nature, e.g., light house work, office work [Normal] : affect appropriate [de-identified] : s/p right lumpectomy, surgical scars noted and well healed; resolution of seroma previously drained; Left breast s/p lumpectomy with healing dante-areolar incisions; minimal seroma;  no adenopathy

## 2023-02-22 ENCOUNTER — APPOINTMENT (OUTPATIENT)
Dept: SURGERY | Facility: CLINIC | Age: 83
End: 2023-02-22
Payer: MEDICARE

## 2023-02-22 PROCEDURE — 99024 POSTOP FOLLOW-UP VISIT: CPT

## 2023-05-02 ENCOUNTER — OUTPATIENT (OUTPATIENT)
Dept: OUTPATIENT SERVICES | Facility: HOSPITAL | Age: 83
LOS: 1 days | Discharge: HOME | End: 2023-05-02

## 2023-05-02 DIAGNOSIS — H26.9 UNSPECIFIED CATARACT: Chronic | ICD-10-CM

## 2023-05-02 DIAGNOSIS — Z98.890 OTHER SPECIFIED POSTPROCEDURAL STATES: Chronic | ICD-10-CM

## 2023-05-02 DIAGNOSIS — C50.919 MALIGNANT NEOPLASM OF UNSPECIFIED SITE OF UNSPECIFIED FEMALE BREAST: ICD-10-CM

## 2023-05-30 ENCOUNTER — APPOINTMENT (OUTPATIENT)
Dept: HEMATOLOGY ONCOLOGY | Facility: CLINIC | Age: 83
End: 2023-05-30
Payer: MEDICARE

## 2023-05-30 VITALS
DIASTOLIC BLOOD PRESSURE: 74 MMHG | OXYGEN SATURATION: 97 % | RESPIRATION RATE: 17 BRPM | HEART RATE: 86 BPM | BODY MASS INDEX: 24.59 KG/M2 | HEIGHT: 65.98 IN | WEIGHT: 153 LBS | SYSTOLIC BLOOD PRESSURE: 130 MMHG

## 2023-05-30 DIAGNOSIS — Z87.39 PERSONAL HISTORY OF OTHER DISEASES OF THE MUSCULOSKELETAL SYSTEM AND CONNECTIVE TISSUE: ICD-10-CM

## 2023-05-30 PROCEDURE — 99214 OFFICE O/P EST MOD 30 MIN: CPT

## 2023-06-01 PROBLEM — Z87.39 HISTORY OF OSTEOPENIA: Status: RESOLVED | Noted: 2021-10-14 | Resolved: 2023-06-01

## 2023-06-01 RX ORDER — LETROZOLE TABLETS 2.5 MG/1
2.5 TABLET, FILM COATED ORAL
Qty: 30 | Refills: 0 | Status: ACTIVE | COMMUNITY
Start: 2023-06-01

## 2023-06-01 RX ORDER — NEOMYCIN SULFATE, POLYMYXIN B SULFATE AND DEXAMETHASONE 3.5; 10000; 1 MG/ML; [USP'U]/ML; MG/ML
3.5-10000-0.1 SUSPENSION OPHTHALMIC
Qty: 10 | Refills: 0 | Status: DISCONTINUED | COMMUNITY
Start: 2023-04-17

## 2023-06-01 RX ORDER — EXEMESTANE 25 MG/1
25 TABLET, FILM COATED ORAL DAILY
Qty: 30 | Refills: 4 | Status: DISCONTINUED | COMMUNITY
Start: 2023-02-09 | End: 2023-06-01

## 2023-06-01 RX ORDER — TRIAMCINOLONE ACETONIDE 1 MG/G
0.1 CREAM TOPICAL
Qty: 1 | Refills: 2 | Status: DISCONTINUED | COMMUNITY
Start: 2021-09-24 | End: 2023-06-01

## 2023-06-01 NOTE — PHYSICAL EXAM
[Restricted in physically strenuous activity but ambulatory and able to carry out work of a light or sedentary nature] : Status 1- Restricted in physically strenuous activity but ambulatory and able to carry out work of a light or sedentary nature, e.g., light house work, office work [Normal] : affect appropriate [de-identified] : s/p right lumpectomy, surgical scars noted and well healed; Left breast s/p lumpectomy with healing dante-areolar incisions; ;  no adenopathy

## 2023-06-01 NOTE — HISTORY OF PRESENT ILLNESS
[Disease: _____________________] : Disease: [unfilled] [T: ___] : T[unfilled] [N: ___] : N[unfilled] [M: ___] : M[unfilled] [AJCC Stage: ____] : AJCC Stage: [unfilled] [90: Able to carry normal activity; minor signs or symptoms of disease.] : 90: Able to carry normal activity; minor signs or symptoms of disease.  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  Patient had a diagnostic mammo/US on 8/24/21 which showed stable calcification, cyst, fat necrosis but on US finding in left breast left 8:00 retroareolar 7mm circumscribed hypoechoic nodule, seen back in 2017 though slightly larger, 6 mos fu rec for stability. No sonographic correlate retroareolar right breast for blood nipple dc complaint, consider MRI,  BIRADS3.\par \par MRI 8/31/21 showed 2.3cm clumped non-mass enhancement in upper central right breast, MRI bx rec; 8mm non mass enhancement in slightly upper inner right breast, MRI biopsy rec.  No suspicious enhancement in left breast.\par \par MRI biopsy on 9/8/21 revealed right upper central breast with DCIS, low to intermed grade, solid and cribriform type, with calcifications and central necrosis, also involving a radial scar, ER 95%, RI 95%, right upper inner breast biopsy benign with fat necrosis.\par \par Pt underwent a right lumpectomy on 9/28/21 with Dr. 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 Right superior margin with radial sclerosing lesion, right lateral margin DCIS 1mm from inked margin. pT1aNx ER 90%, RI 70%, HER2 neg IHC 0/1+. \par \par Most recently developed DCIS of left breast s/p lumpectomy on 1/24/23\par  she was prescribed letrazole again by Dr. Ramsay that time and reluctantly started medication around 2/10/2023. 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 resendiz.  \par She denies any joint pain, but does not stiffness and some muscle cramping (manageable). \par c/o hair thinning\par Patient denies any breast masses, breast tenderness, skin changes or nipple discharge.\par \par She also has a history of osteoporosis (T -2.7) she was seen by Dr. Pineda who recommended treatment with actonel but patient has not started because of concerns of side effects including reports she read of association with MI.  She is worried about taking calcium supplements because her pcp advised against it. \par \par Imaging Summary:\par Mammo: 12/2022\par MRI Breast: 12/2029\par BMD: 12/2021\par \par \par \par \par \par \par \par \par \par HEALTH MAINTENANCE\par PCP Dr. Clint Pascual, June 2022\par OPHTHO bilateral cataract surgery 9/2020 left, 5/21/21 right eye.  wearing prism glasses for exotropia which is slightly helping. \par GYN last year, no vaginal bleeding or spotting.\par GI no previous colonoscopy.\par DERM rash 2/2 ?bed bugs, now resolved\par walks with walker, difficult ambulating due to vision, wearing prism glasses

## 2023-06-01 NOTE — ASSESSMENT
[FreeTextEntry1] : BRIAN MART  is a 82 year old female with prior history of Left breast cancer in 2006 s/p RT and several trials of endocrine therapy (stopped anastrazole due to joint pains after 8 months; stopped tamoxifen after 1 year due to endometrial lining thickening); s/p RIght breast DCIS in 11/2013 s/p lumpectomy ER40%, TN 30%; again  recurrent disease in 9/2021 with right breast invasive well differentiated ductal (2 mm) carcinoma with tubular features, ER 90%, TN 70%, HER2 neg, pT1aNx. s/p right lumpectomy (no radiation due to prior radiation). Was prescribed endocrine therapy thereafter but unable to tolerate due to joint issues despite multiple attempts for trials with different agents\par \par Most recently developed DCIS of left breast s/p lumpectomy on 1/24/23\par - started letrozole approximately 2/2023: advised to continue current therapy as side effects are manageable\par - not a candidate for radiation therapy due to prior radiation\par - encouraged f/up with Dr. Ramsay as scheduled\par - Imaging Schedule per Dr. Ramsay recommendations\par - Patient had the opportunity to have all their questions answered to their satisfaction \par \par Hair Thinning\par - recommend Biotin 5000 mcg daily\par \par Osteoporosis\par -strongly encouraged f/up w/ Dr. Pineda for osteoporosis therapy \par - encouraged treatment of osteoporosis regardless of AI therapy \par \par - f/up 3 months\par

## 2023-06-01 NOTE — REVIEW OF SYSTEMS
[Vision Problems] : vision problems [Joint Stiffness] : joint stiffness [Joint Pain] : no joint pain [Muscle Pain] : no muscle pain [Muscle Weakness] : no muscle weakness [Negative] : Psychiatric [FreeTextEntry3] : using prism glasses [de-identified] : hair thinning.

## 2023-07-26 ENCOUNTER — APPOINTMENT (OUTPATIENT)
Dept: SURGERY | Facility: CLINIC | Age: 83
End: 2023-07-26
Payer: MEDICARE

## 2023-07-26 PROCEDURE — 99213K: CUSTOM

## 2023-08-07 NOTE — H&P PST ADULT - NSANTHBMIRD_ENT_A_CORE
The patient is Stable - Low risk of patient condition declining or worsening    Shift Goals  Clinical Goals: no neuro changes  Patient Goals: sleep  Family Goals: N/A    Progress made toward(s) clinical / shift goals:  no changes to neuro status    Problem: Knowledge Deficit - Standard  Goal: Patient and family/care givers will demonstrate understanding of plan of care, disease process/condition, diagnostic tests and medications  Description: Target End Date:  1-3 days or as soon as patient condition allows    Document in Patient Education    1.  Patient and family/caregiver oriented to unit, equipment, visitation policy and means for communicating concern  2.  Complete/review Learning Assessment  3.  Assess knowledge level of disease process/condition, treatment plan, diagnostic tests and medications  4.  Explain disease process/condition, treatment plan, diagnostic tests and medications  Outcome: Progressing       Patient is not progressing towards the following goals:       No

## 2023-08-08 ENCOUNTER — OUTPATIENT (OUTPATIENT)
Dept: OUTPATIENT SERVICES | Facility: HOSPITAL | Age: 83
LOS: 1 days | Discharge: HOME | End: 2023-08-08

## 2023-08-08 DIAGNOSIS — Z98.890 OTHER SPECIFIED POSTPROCEDURAL STATES: Chronic | ICD-10-CM

## 2023-08-08 DIAGNOSIS — H26.9 UNSPECIFIED CATARACT: Chronic | ICD-10-CM

## 2023-08-08 DIAGNOSIS — C50.919 MALIGNANT NEOPLASM OF UNSPECIFIED SITE OF UNSPECIFIED FEMALE BREAST: ICD-10-CM

## 2023-08-15 ENCOUNTER — APPOINTMENT (OUTPATIENT)
Dept: HEMATOLOGY ONCOLOGY | Facility: CLINIC | Age: 83
End: 2023-08-15
Payer: MEDICARE

## 2023-08-15 VITALS
RESPIRATION RATE: 16 BRPM | SYSTOLIC BLOOD PRESSURE: 134 MMHG | OXYGEN SATURATION: 99 % | TEMPERATURE: 97.9 F | DIASTOLIC BLOOD PRESSURE: 78 MMHG | HEART RATE: 71 BPM | BODY MASS INDEX: 24.03 KG/M2 | WEIGHT: 148.81 LBS

## 2023-08-15 DIAGNOSIS — Z01.818 ENCOUNTER FOR OTHER PREPROCEDURAL EXAMINATION: ICD-10-CM

## 2023-08-15 DIAGNOSIS — Z87.2 PERSONAL HISTORY OF DISEASES OF THE SKIN AND SUBCUTANEOUS TISSUE: ICD-10-CM

## 2023-08-15 DIAGNOSIS — L24.9 IRRITANT CONTACT DERMATITIS, UNSPECIFIED CAUSE: ICD-10-CM

## 2023-08-15 PROCEDURE — 99214 OFFICE O/P EST MOD 30 MIN: CPT

## 2023-08-19 PROBLEM — Z87.2 HISTORY OF DERMATITIS: Status: RESOLVED | Noted: 2021-09-24 | Resolved: 2023-08-19

## 2023-08-19 PROBLEM — L24.9 IRRITANT CONTACT DERMATITIS: Status: RESOLVED | Noted: 2021-09-25 | Resolved: 2023-08-19

## 2023-08-19 PROBLEM — Z01.818 PREOP TESTING: Status: RESOLVED | Noted: 2021-09-24 | Resolved: 2023-08-19

## 2023-08-19 NOTE — REVIEW OF SYSTEMS
[Vision Problems] : vision problems [Joint Stiffness] : joint stiffness [Negative] : Allergic/Immunologic [Joint Pain] : no joint pain [Muscle Pain] : no muscle pain [Muscle Weakness] : no muscle weakness [FreeTextEntry3] : using prism glasses [de-identified] : hair thinning.

## 2023-08-19 NOTE — HISTORY OF PRESENT ILLNESS
[Disease: _____________________] : Disease: [unfilled] [T: ___] : T[unfilled] [N: ___] : N[unfilled] [M: ___] : M[unfilled] [AJCC Stage: ____] : AJCC Stage: [unfilled] [90: Able to carry normal activity; minor signs or symptoms of disease.] : 90: Able to carry normal activity; minor signs or symptoms of disease.  [de-identified] : BRIAN MART  is a 81 year old female here for management of right breast cancer.   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 No sonographic correlate retroareolar right breast for blood nipple dc complaint, consider MRI,  BIRADS3.  MRI 8/31/21 showed 2.3cm clumped non-mass enhancement in upper central right breast, MRI bx rec; 8mm non mass enhancement in slightly upper inner right breast, MRI biopsy rec.  No suspicious enhancement in left breast.  MRI biopsy on 9/8/21 revealed right upper central breast with DCIS, low to intermed grade, solid and cribriform type, with calcifications and central necrosis, also involving a radial scar, ER 95%, PA 95%, right upper inner breast biopsy benign with fat necrosis.  Pt underwent a right lumpectomy on 9/28/21 with Dr. 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 Right superior margin with radial sclerosing lesion, right lateral margin DCIS 1mm from inked margin. pT1aNx ER 90%, PA 70%, HER2 neg IHC 0/1+.   Most recently developed DCIS of left breast s/p lumpectomy on 1/24/23  she was prescribed letrazole again by Dr. Ramsay that time and reluctantly started medication around 2/10/2023.  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 Ramsay.   She denies any joint pain, but does not stiffness and some muscle cramping (manageable).  c/o hair thinning; has been advised to take biotin  Patient denies any breast masses, breast tenderness, skin changes or nipple discharge.  She also has a history of osteoporosis (T -2.7) she was seen by Dr. Pineda who recommended treatment with actonel but patient has not started because of concerns of side effects including reports she read of association with MI.  She was worried about taking calcium supplements because her pcp advised against it but has been taking them regardless. Reassured that calcium level has been stable  Imaging Summary: Mammo: 12/2022 - new amorphous finding in left breast s/p biopsy MRI Breast: 12/2022 - left breast 1.8cm biopsy proven DCIS; left breast with additional 6mm lower central breast lesion BMD: 12/2021 with Dr. Pineda; has repeat appointment in 11/2023   HEALTH MAINTENANCE PCP Dr. Clint Pascual, June 2022 OPHTHO bilateral cataract surgery 9/2020 left, 5/21/21 right eye.  wearing prism glasses for exotropia which is slightly helping.  GYN  no vaginal bleeding or spotting. GI no previous colonoscopy difficult ambulating due to vision, wearing prism glasses;  at home uses walker

## 2023-08-19 NOTE — ASSESSMENT
[FreeTextEntry1] : BRIAN MART  is a 83 year old female with prior history of Left breast cancer in 2006 s/p RT and several trials of endocrine therapy (stopped anastrazole due to joint pains after 8 months; stopped tamoxifen after 1 year due to endometrial lining thickening); s/p RIght breast DCIS in 11/2013 s/p lumpectomy ER40%, IL 30%; again  recurrent disease in 9/2021 with right breast invasive well differentiated ductal (2 mm) carcinoma with tubular features, ER 90%, IL 70%, HER2 neg, pT1aNx. s/p right lumpectomy (no radiation due to prior radiation). Was prescribed endocrine therapy thereafter but unable to tolerate due to joint issues despite multiple attempts for trials with different agents  Most recently developed DCIS of left breast s/p lumpectomy on 1/24/23 - started letrozole approximately 2/2023: advised to continue current therapy as side effects are manageable - not a candidate for radiation therapy due to prior radiation - encouraged f/up with Dr. Ramsay as scheduled - Imaging Schedule per Dr. Ramsay recommendations  Hair Thinning - recommend Biotin 5000 mcg daily  Osteoporosis -encouraged f/up w/ Dr. Pineda for osteoporosis therapy ; would need bone density testing which can be done in his office - encouraged treatment of osteoporosis regardless of AI therapy   - Patient had the opportunity to have all their questions answered to their satisfaction  - f/up 3 months

## 2023-08-19 NOTE — PHYSICAL EXAM
[Restricted in physically strenuous activity but ambulatory and able to carry out work of a light or sedentary nature] : Status 1- Restricted in physically strenuous activity but ambulatory and able to carry out work of a light or sedentary nature, e.g., light house work, office work [Normal] : normal appearance, no rash, nodules, vesicles, ulcers, erythema [de-identified] : s/p right lumpectomy, surgical scars noted and well healed; Left breast s/p lumpectomy with healing dante-areolar incisions;  no adenopathy

## 2023-08-29 ENCOUNTER — APPOINTMENT (OUTPATIENT)
Dept: OTOLARYNGOLOGY | Facility: CLINIC | Age: 83
End: 2023-08-29

## 2023-10-02 ENCOUNTER — RESULT REVIEW (OUTPATIENT)
Age: 83
End: 2023-10-02

## 2023-10-04 ENCOUNTER — APPOINTMENT (OUTPATIENT)
Dept: RADIOLOGY | Facility: IMAGING CENTER | Age: 83
End: 2023-10-04
Payer: MEDICARE

## 2023-10-04 ENCOUNTER — OUTPATIENT (OUTPATIENT)
Dept: OUTPATIENT SERVICES | Facility: HOSPITAL | Age: 83
LOS: 1 days | End: 2023-10-04
Payer: MEDICARE

## 2023-10-04 DIAGNOSIS — C50.911 MALIGNANT NEOPLASM OF UNSPECIFIED SITE OF RIGHT FEMALE BREAST: ICD-10-CM

## 2023-10-04 DIAGNOSIS — H26.9 UNSPECIFIED CATARACT: Chronic | ICD-10-CM

## 2023-10-04 DIAGNOSIS — Z98.890 OTHER SPECIFIED POSTPROCEDURAL STATES: Chronic | ICD-10-CM

## 2023-10-04 PROCEDURE — 72170 X-RAY EXAM OF PELVIS: CPT | Mod: 26

## 2023-10-04 PROCEDURE — 72220 X-RAY EXAM SACRUM TAILBONE: CPT | Mod: 26

## 2023-10-04 PROCEDURE — 72170 X-RAY EXAM OF PELVIS: CPT

## 2023-10-04 PROCEDURE — 72220 X-RAY EXAM SACRUM TAILBONE: CPT

## 2023-10-05 ENCOUNTER — NON-APPOINTMENT (OUTPATIENT)
Age: 83
End: 2023-10-05

## 2023-10-23 ENCOUNTER — OUTPATIENT (OUTPATIENT)
Dept: OUTPATIENT SERVICES | Facility: HOSPITAL | Age: 83
LOS: 1 days | Discharge: HOME | End: 2023-10-23

## 2023-10-23 DIAGNOSIS — C50.919 MALIGNANT NEOPLASM OF UNSPECIFIED SITE OF UNSPECIFIED FEMALE BREAST: ICD-10-CM

## 2023-10-23 DIAGNOSIS — Z98.890 OTHER SPECIFIED POSTPROCEDURAL STATES: Chronic | ICD-10-CM

## 2023-10-23 DIAGNOSIS — H26.9 UNSPECIFIED CATARACT: Chronic | ICD-10-CM

## 2023-10-24 ENCOUNTER — APPOINTMENT (OUTPATIENT)
Dept: HEMATOLOGY ONCOLOGY | Facility: CLINIC | Age: 83
End: 2023-10-24
Payer: MEDICARE

## 2023-10-24 VITALS
HEART RATE: 75 BPM | HEIGHT: 65.98 IN | SYSTOLIC BLOOD PRESSURE: 165 MMHG | RESPIRATION RATE: 17 BRPM | DIASTOLIC BLOOD PRESSURE: 81 MMHG | BODY MASS INDEX: 23.81 KG/M2 | WEIGHT: 148.15 LBS | OXYGEN SATURATION: 98 % | TEMPERATURE: 97.4 F

## 2023-10-24 PROCEDURE — 99214 OFFICE O/P EST MOD 30 MIN: CPT

## 2023-11-01 ENCOUNTER — APPOINTMENT (OUTPATIENT)
Dept: OBGYN | Facility: CLINIC | Age: 83
End: 2023-11-01
Payer: MEDICARE

## 2023-11-01 ENCOUNTER — ASOB RESULT (OUTPATIENT)
Age: 83
End: 2023-11-01

## 2023-11-01 VITALS
DIASTOLIC BLOOD PRESSURE: 98 MMHG | WEIGHT: 147 LBS | HEIGHT: 66 IN | SYSTOLIC BLOOD PRESSURE: 179 MMHG | BODY MASS INDEX: 23.63 KG/M2

## 2023-11-01 DIAGNOSIS — Z01.419 ENCOUNTER FOR GYNECOLOGICAL EXAMINATION (GENERAL) (ROUTINE) W/OUT ABNORMAL FINDINGS: ICD-10-CM

## 2023-11-01 PROCEDURE — G0101: CPT

## 2023-11-01 PROCEDURE — 76830 TRANSVAGINAL US NON-OB: CPT

## 2023-11-01 PROCEDURE — 76857 US EXAM PELVIC LIMITED: CPT

## 2023-11-13 LAB
CYTOLOGY CVX/VAG DOC THIN PREP: ABNORMAL
HPV HIGH+LOW RISK DNA PNL CVX: NOT DETECTED

## 2024-02-14 ENCOUNTER — APPOINTMENT (OUTPATIENT)
Dept: MAMMOGRAPHY | Facility: IMAGING CENTER | Age: 84
End: 2024-02-14
Payer: MEDICARE

## 2024-02-14 ENCOUNTER — OUTPATIENT (OUTPATIENT)
Dept: OUTPATIENT SERVICES | Facility: HOSPITAL | Age: 84
LOS: 1 days | End: 2024-02-14
Payer: MEDICARE

## 2024-02-14 ENCOUNTER — APPOINTMENT (OUTPATIENT)
Dept: ULTRASOUND IMAGING | Facility: IMAGING CENTER | Age: 84
End: 2024-02-14
Payer: MEDICARE

## 2024-02-14 DIAGNOSIS — H26.9 UNSPECIFIED CATARACT: Chronic | ICD-10-CM

## 2024-02-14 DIAGNOSIS — Z98.890 OTHER SPECIFIED POSTPROCEDURAL STATES: Chronic | ICD-10-CM

## 2024-02-14 DIAGNOSIS — Z00.8 ENCOUNTER FOR OTHER GENERAL EXAMINATION: ICD-10-CM

## 2024-02-14 PROCEDURE — 77066 DX MAMMO INCL CAD BI: CPT

## 2024-02-14 PROCEDURE — 76641 ULTRASOUND BREAST COMPLETE: CPT | Mod: 26,50,GY

## 2024-02-14 PROCEDURE — 77066 DX MAMMO INCL CAD BI: CPT | Mod: 26

## 2024-02-14 PROCEDURE — G0279: CPT | Mod: 26

## 2024-02-14 PROCEDURE — 76641 ULTRASOUND BREAST COMPLETE: CPT

## 2024-02-14 PROCEDURE — G0279: CPT

## 2024-02-19 ENCOUNTER — OUTPATIENT (OUTPATIENT)
Dept: OUTPATIENT SERVICES | Facility: HOSPITAL | Age: 84
LOS: 1 days | Discharge: HOME | End: 2024-02-19

## 2024-02-19 DIAGNOSIS — Z98.890 OTHER SPECIFIED POSTPROCEDURAL STATES: Chronic | ICD-10-CM

## 2024-02-19 DIAGNOSIS — H26.9 UNSPECIFIED CATARACT: Chronic | ICD-10-CM

## 2024-02-19 DIAGNOSIS — C50.919 MALIGNANT NEOPLASM OF UNSPECIFIED SITE OF UNSPECIFIED FEMALE BREAST: ICD-10-CM

## 2024-02-21 RX ORDER — LETROZOLE TABLETS 2.5 MG/1
2.5 TABLET, FILM COATED ORAL
Qty: 90 | Refills: 3 | Status: ACTIVE | COMMUNITY
Start: 2024-02-21 | End: 1900-01-01

## 2024-02-29 ENCOUNTER — APPOINTMENT (OUTPATIENT)
Dept: HEMATOLOGY ONCOLOGY | Facility: CLINIC | Age: 84
End: 2024-02-29

## 2024-03-11 ENCOUNTER — APPOINTMENT (OUTPATIENT)
Dept: ENDOCRINOLOGY | Facility: CLINIC | Age: 84
End: 2024-03-11
Payer: MEDICARE

## 2024-03-11 VITALS
WEIGHT: 146.31 LBS | SYSTOLIC BLOOD PRESSURE: 110 MMHG | DIASTOLIC BLOOD PRESSURE: 70 MMHG | TEMPERATURE: 98 F | BODY MASS INDEX: 23.52 KG/M2 | OXYGEN SATURATION: 98 % | HEIGHT: 66 IN | HEART RATE: 78 BPM

## 2024-03-11 DIAGNOSIS — M81.0 AGE-RELATED OSTEOPOROSIS W/OUT CURRENT PATHOLOGICAL FRACTURE: ICD-10-CM

## 2024-03-11 DIAGNOSIS — E78.00 PURE HYPERCHOLESTEROLEMIA, UNSPECIFIED: ICD-10-CM

## 2024-03-11 DIAGNOSIS — I10 ESSENTIAL (PRIMARY) HYPERTENSION: ICD-10-CM

## 2024-03-11 DIAGNOSIS — C50.911 MALIGNANT NEOPLASM OF UNSPECIFIED SITE OF RIGHT FEMALE BREAST: ICD-10-CM

## 2024-03-11 PROCEDURE — 99214 OFFICE O/P EST MOD 30 MIN: CPT

## 2024-03-11 PROCEDURE — 36415 COLL VENOUS BLD VENIPUNCTURE: CPT

## 2024-03-11 NOTE — PHYSICAL EXAM
[Alert] : alert [Well Nourished] : well nourished [No Acute Distress] : no acute distress [Well Developed] : well developed [Normal Sclera/Conjunctiva] : normal sclera/conjunctiva [EOMI] : extra ocular movement intact [Normal Oropharynx] : the oropharynx was normal [No Proptosis] : no proptosis [Thyroid Not Enlarged] : the thyroid was not enlarged [No Respiratory Distress] : no respiratory distress [No Thyroid Nodules] : no palpable thyroid nodules [Clear to Auscultation] : lungs were clear to auscultation bilaterally [No Accessory Muscle Use] : no accessory muscle use [Normal Rate] : heart rate was normal [Normal S1, S2] : normal S1 and S2 [No Edema] : no peripheral edema [Regular Rhythm] : with a regular rhythm [Normal Bowel Sounds] : normal bowel sounds [Pedal Pulses Normal] : the pedal pulses are present [Not Distended] : not distended [Not Tender] : non-tender [Soft] : abdomen soft [Normal Anterior Cervical Nodes] : no anterior cervical lymphadenopathy [No Spinal Tenderness] : no spinal tenderness [Normal Posterior Cervical Nodes] : no posterior cervical lymphadenopathy [Spine Straight] : spine straight [No Stigmata of Cushings Syndrome] : no stigmata of Cushings Syndrome [Normal Gait] : normal gait [Normal Strength/Tone] : muscle strength and tone were normal [No Rash] : no rash [Normal Reflexes] : deep tendon reflexes were 2+ and symmetric [No Tremors] : no tremors [Oriented x3] : oriented to person, place, and time [Acanthosis Nigricans] : no acanthosis nigricans

## 2024-03-11 NOTE — ADDENDUM
[FreeTextEntry1] : Blood will be drawn in office today.  This note was written by Ya Oconnor on 03/11/2024 acting as medical scribe for Dr. Frank Pineda. I, Dr. Frank Pineda, have read and attest that all the information, medical decision making and discharge instructions within are true and accurate.

## 2024-03-11 NOTE — HISTORY OF PRESENT ILLNESS
[FreeTextEntry1] : Ms. MART is an 83-year-old female who returns with regard to a history of osteoporosis diagnosed on most recent DEXA scan from 12/14/21. No subsequent Dexa carried out yet.  Latest dexa scan from 12/14/21 did show: T score spine -0.9 T score femoral neck -2.7 T score total hip -2.4  Comparison is limited to prior scan from 2009 due to different equipment. Did have another scan in 2014 at [facility] which showed osteopenia.  Denies history of recent falls/injuries bony fractures. Is unsure of family history of osteoporosis. Denies use of steroids or prolonged immobilization. Denies hx of celiac's or other malabsorption syndromes or hx of kidney stones. She feels that she does not get enough calcium from diet; rarely eats milk/yogurt. She is taking daily calcium 500 mg daily, and D3 1000 IU daily.   She has history of breast cancer b/l diagnosed in 2005 s/p left lumpectomy in 2007 and right lumpectomy in 2014 with radiation b/l.  MRI from August 2021 did show DCIS and additional right lumpectomy in 09/2021. She was instructed to start Femora (aromatase inhibitor) afterwards, however, patient did not start it. She did take another aromatase inhibitor (Rimadex for 8 months, tamoxifen for 1.5 year, Femora, Aromasin) for a total of 4 years in the past with breaks and has had side effects of bone/joint pain in past. Last time she has used an aromatase inhibitor was 2011. Patient did develop DCIS of the left breast about 1 years ago, and now returns on Letrozole which she has been taking for almost one year.    Additional medical history includes that of Olvin Zavala following with Dr. Spears. She is s/p right lumpectomy 9/28/2021, HTN, HLD, mild CKD (latest GFR 50 in 2020)  Medications: Letrozole, Amlodipine 2.5 mg, calcium 500 mg with vitamin D3, vitamin D3 1000 IU daily,   Surg Onc: Dr. Ramsay PMD: Dr. Pascual

## 2024-03-12 LAB
25(OH)D3 SERPL-MCNC: 47.6 NG/ML
ALBUMIN SERPL ELPH-MCNC: 4.5 G/DL
ALP BLD-CCNC: 75 U/L
ALT SERPL-CCNC: 11 U/L
ANION GAP SERPL CALC-SCNC: 13 MMOL/L
AST SERPL-CCNC: 17 U/L
BILIRUB SERPL-MCNC: 0.4 MG/DL
BUN SERPL-MCNC: 14 MG/DL
CALCIUM SERPL-MCNC: 10.5 MG/DL
CALCIUM SERPL-MCNC: 10.5 MG/DL
CHLORIDE SERPL-SCNC: 102 MMOL/L
CO2 SERPL-SCNC: 25 MMOL/L
CREAT SERPL-MCNC: 1.11 MG/DL
EGFR: 49 ML/MIN/1.73M2
GLUCOSE SERPL-MCNC: 76 MG/DL
MAGNESIUM SERPL-MCNC: 2.2 MG/DL
PARATHYROID HORMONE INTACT: 28 PG/ML
PHOSPHATE SERPL-MCNC: 3.5 MG/DL
POTASSIUM SERPL-SCNC: 4.2 MMOL/L
PROT SERPL-MCNC: 7.8 G/DL
SODIUM SERPL-SCNC: 140 MMOL/L
T3FREE SERPL-MCNC: 2.44 PG/ML
T4 FREE SERPL-MCNC: 1 NG/DL
TSH SERPL-ACNC: 2.05 UIU/ML

## 2024-03-14 LAB — OSTEOCALCIN SERPL-MCNC: 21.4 NG/ML

## 2024-03-15 LAB — ALP BONE SERPL-MCNC: 12.6 UG/L

## 2024-03-19 LAB — COLLAGEN CTX SERPL-MCNC: 359 PG/ML

## 2024-04-11 ENCOUNTER — APPOINTMENT (OUTPATIENT)
Dept: CARDIOLOGY | Facility: CLINIC | Age: 84
End: 2024-04-11
Payer: MEDICARE

## 2024-04-11 VITALS
HEART RATE: 82 BPM | OXYGEN SATURATION: 95 % | DIASTOLIC BLOOD PRESSURE: 80 MMHG | HEIGHT: 66 IN | SYSTOLIC BLOOD PRESSURE: 152 MMHG | BODY MASS INDEX: 23.46 KG/M2 | WEIGHT: 146 LBS

## 2024-04-11 DIAGNOSIS — R06.09 OTHER FORMS OF DYSPNEA: ICD-10-CM

## 2024-04-11 PROCEDURE — 93000 ELECTROCARDIOGRAM COMPLETE: CPT

## 2024-04-11 PROCEDURE — 99204 OFFICE O/P NEW MOD 45 MIN: CPT

## 2024-04-12 ENCOUNTER — TRANSCRIPTION ENCOUNTER (OUTPATIENT)
Age: 84
End: 2024-04-12

## 2024-04-29 ENCOUNTER — APPOINTMENT (OUTPATIENT)
Dept: CARDIOLOGY | Facility: CLINIC | Age: 84
End: 2024-04-29
Payer: MEDICARE

## 2024-04-29 PROCEDURE — 93306 TTE W/DOPPLER COMPLETE: CPT

## 2024-05-01 ENCOUNTER — APPOINTMENT (OUTPATIENT)
Dept: CARDIOLOGY | Facility: CLINIC | Age: 84
End: 2024-05-01

## 2024-05-02 ENCOUNTER — OUTPATIENT (OUTPATIENT)
Dept: OUTPATIENT SERVICES | Facility: HOSPITAL | Age: 84
LOS: 1 days | Discharge: HOME | End: 2024-05-02

## 2024-05-02 DIAGNOSIS — C50.919 MALIGNANT NEOPLASM OF UNSPECIFIED SITE OF UNSPECIFIED FEMALE BREAST: ICD-10-CM

## 2024-05-02 DIAGNOSIS — Z98.890 OTHER SPECIFIED POSTPROCEDURAL STATES: Chronic | ICD-10-CM

## 2024-05-02 DIAGNOSIS — H26.9 UNSPECIFIED CATARACT: Chronic | ICD-10-CM

## 2024-05-06 ENCOUNTER — APPOINTMENT (OUTPATIENT)
Dept: HEMATOLOGY ONCOLOGY | Facility: CLINIC | Age: 84
End: 2024-05-06
Payer: MEDICARE

## 2024-05-06 VITALS
WEIGHT: 146.6 LBS | BODY MASS INDEX: 23.66 KG/M2 | TEMPERATURE: 97.2 F | RESPIRATION RATE: 16 BRPM | DIASTOLIC BLOOD PRESSURE: 77 MMHG | SYSTOLIC BLOOD PRESSURE: 159 MMHG | OXYGEN SATURATION: 98 % | HEART RATE: 72 BPM

## 2024-05-06 DIAGNOSIS — R14.0 ABDOMINAL DISTENSION (GASEOUS): ICD-10-CM

## 2024-05-06 DIAGNOSIS — M53.3 SACROCOCCYGEAL DISORDERS, NOT ELSEWHERE CLASSIFIED: ICD-10-CM

## 2024-05-06 DIAGNOSIS — Z79.899 OTHER LONG TERM (CURRENT) DRUG THERAPY: ICD-10-CM

## 2024-05-06 DIAGNOSIS — D05.12 INTRADUCTAL CARCINOMA IN SITU OF LEFT BREAST: ICD-10-CM

## 2024-05-06 PROCEDURE — G2211 COMPLEX E/M VISIT ADD ON: CPT

## 2024-05-06 PROCEDURE — 99215 OFFICE O/P EST HI 40 MIN: CPT

## 2024-05-06 NOTE — ASSESSMENT
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 s/p right lumpectomy (no radiation due to prior radiation). Was prescribed endocrine therapy thereafter but unable to tolerate due to joint issues despite multiple attempts for trials with different agents  Most recently developed DCIS of left breast s/p lumpectomy on 1/24/23 - started letrozole approximately 2/2023:  - she has stopped letrozole up to two weeks and feels her ab pain/back pain may be better but is still present. - not a candidate for radiation therapy due to prior radiation - encouraged f/up with Dr. Ramsay as scheduled - Imaging Schedule per Dr. Ramsay recommendations  Ongoing persistent abdominal discomfort, possible distension and pelvic pressure - advised again to have CT abdomen/pelvis with contrast to further evaluate; advised GI evaluation  - gyn workup negative - if workup negative - will need GI workup also  Lower back pain - xrays negative - if CT scans of abdomen/pelvis is unremarkable will obtain MR L-spine (non contrast)  Osteoporosis -encouraged f/up w/ Dr. Pineda for osteoporosis therapy ; would need bone density testing which can be done in his office - encouraged treatment of osteoporosis regardless of AI therapy  - Patient had the opportunity to have all their questions answered to their satisfaction - f/up 6 months.

## 2024-05-06 NOTE — REVIEW OF SYSTEMS
[Vision Problems] : vision problems [Abdominal Pain] : abdominal pain [Joint Stiffness] : joint stiffness [Muscle Pain] : muscle pain [Negative] : Allergic/Immunologic [Joint Pain] : no joint pain [Muscle Weakness] : no muscle weakness [FreeTextEntry3] : using prism glasses [FreeTextEntry7] : abdominal distenstion [FreeTextEntry9] : lower back pain  [de-identified] : hair thinning.

## 2024-05-06 NOTE — HISTORY OF PRESENT ILLNESS
[Disease: _____________________] : Disease: [unfilled] [T: ___] : T[unfilled] [N: ___] : N[unfilled] [M: ___] : M[unfilled] [AJCC Stage: ____] : AJCC Stage: [unfilled] [90: Able to carry normal activity; minor signs or symptoms of disease.] : 90: Able to carry normal activity; minor signs or symptoms of disease.  [de-identified] : BRIAN MART  is a 81 year old female here for management of right breast cancer.   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   h/o right lumpectomy in 11/12/2013 for DCIS, s/p RT at [facility]  Patient had a diagnostic mammo/US on 8/24/21 which showed stable calcification, cyst, fat necrosis but on US finding in left breast left 8:00 retroareolar 7mm circumscribed hypoechoic nodule, seen back in 2017 though slightly larger, 6 mos fu rec for stability. No sonographic correlate retroareolar right breast for blood nipple dc complaint, consider MRI,  BIRADS3.  MRI 8/31/21 showed 2.3cm clumped non-mass enhancement in upper central right breast, MRI bx rec; 8mm non mass enhancement in slightly upper inner right breast, MRI biopsy rec.  No suspicious enhancement in left breast.  MRI biopsy on 9/8/21 revealed right upper central breast with DCIS, low to intermed grade, solid and cribriform type, with calcifications and central necrosis, also involving a radial scar, ER 95%, OH 95%, right upper inner breast biopsy benign with fat necrosis.  Pt underwent a right lumpectomy on 9/28/21 with Dr. Cassandra Ramsay which showed 2mm invasive well differentiated ductal carcinoma with tubular features, grade 1, ER 90%, OH 70%, HER2 neg IHC 0/1+ staining;  assoc DCIS 40mm, solid and cribriform types with intermed nuclear grade, necrosis and microcalcifications, biopsy site changes. Right superior margin with radial sclerosing lesion, right lateral margin DCIS 1mm from inked margin. pT1aNx ER 90%, OH 70%, HER2 neg IHC 0/1+.   Most recently developed DCIS of left breast s/p lumpectomy on 1/24/23  she was prescribed letrazole again by Dr. Ramsay that time and reluctantly started medication around 2/10/2023.  not a candidate for radiation therapy due to prior radiation  [de-identified] :  invasive well differentiated ductal carcinoma with tubular features, grade 1, ER 90%, UT 70%, HER2 neg IHC 0/1+ staining [de-identified] : 5/6/2024 Returns today with similar concerns as noted at last visit.  Additionally in Early April 2024 - felt she had difficulty walking/dyspnea?? / seen by internist and started on Crestor; seen by cardio to have echo and stress s test; also started on metoprolol and amlodipine Stopped letrozole and crestor and feels that her abdominal pain, lower back pain is better but not resolved;  Patient prievously noed of lower pelvic pain, lower back pain and abdominal distention.  The abdominal distension may have been present longer - did not have CT scans as recommended;  seen by Gyn as recommended who referred her to gastro but she did not followup and has not had a colonoscopy ever.  Audrey had a pelvic xray and sacral biopsy which was unremarkable. She notes the pain is manageable, but is present most days, "moves around" and does not bother her when she is sleeping. Saw by Dr. Pineda - ordered a bone density test but didn't go ifor it. .  Patient with multiple episodes of breast cancer from IDC to DCIS - most recently 1/2023; she did start letrozole as recommended by Dr. Ramsay.    She denies any vaginal bleeding, weight loss, dysuria, hematuria.   She has never had a colonoscopy.  Patient denies any breast masses, breast tenderness, skin changes or nipple discharge.  She also has a history of osteoporosis (T -2.7) she was seen by Dr. Pineda who recommended treatment with actonel but patient has not started because of concerns of side effects including reports she read of association with MI.  She was worried about taking calcium supplements because her pcp advised against it but has been taking them regardless. Reassured that calcium level has been stable  Imaging Summary: Mammo: 2/2024  MRI Breast: 12/2022 - left breast 1.8cm biopsy proven DCIS; left breast with additional 6mm lower central breast lesion BMD: 12/2021 with Dr. Pineda; has repeat appointment in 11/2023   HEALTH MAINTENANCE PCP Dr. Clint Pascual, June 2022 OPHTHO bilateral cataract surgery 9/2020 left, 5/21/21 right eye.  wearing prism glasses for exotropia which is slightly helping.  GYN  no vaginal bleeding or spotting. GI no previous colonoscopy difficult ambulating due to vision, wearing prism glasses;  at home uses walker

## 2024-05-06 NOTE — PHYSICAL EXAM
[Restricted in physically strenuous activity but ambulatory and able to carry out work of a light or sedentary nature] : Status 1- Restricted in physically strenuous activity but ambulatory and able to carry out work of a light or sedentary nature, e.g., light house work, office work [Normal] : affect appropriate [de-identified] : (ds/p right lumpectomy, surgical scars noted and well healed; Left breast s/p lumpectomy with healing dante-areolar incisions;  no adenopathy;  left axilla 1 cm superficial mass which patient states has been present since her sentinel lymph node biopsy  [de-identified] : possible distenstion noted when patient is standing up, abdomen is very soft, nontender, no masses, no HSM appreciated [de-identified] : no cervical, axillary, scv or inquinal adenopathy

## 2024-05-06 NOTE — END OF VISIT
[FreeTextEntry3] : Patient being seen as per physician's primary plan of care. Dr. Tay was present for this visit and advised on primary plan of care for this patient. [Time Spent: ___ minutes] : I have spent [unfilled] minutes of time on the encounter.

## 2024-05-20 ENCOUNTER — APPOINTMENT (OUTPATIENT)
Dept: CARDIOLOGY | Facility: CLINIC | Age: 84
End: 2024-05-20
Payer: MEDICARE

## 2024-05-20 DIAGNOSIS — Z86.79 PERSONAL HISTORY OF OTHER DISEASES OF THE CIRCULATORY SYSTEM: ICD-10-CM

## 2024-05-20 DIAGNOSIS — R94.39 ABNORMAL RESULT OF OTHER CARDIOVASCULAR FUNCTION STUDY: ICD-10-CM

## 2024-05-20 PROCEDURE — 93351 STRESS TTE COMPLETE: CPT

## 2024-06-17 ENCOUNTER — NON-APPOINTMENT (OUTPATIENT)
Age: 84
End: 2024-06-17

## 2024-06-17 ENCOUNTER — APPOINTMENT (OUTPATIENT)
Dept: CARDIOLOGY | Facility: CLINIC | Age: 84
End: 2024-06-17
Payer: MEDICARE

## 2024-06-17 VITALS
HEART RATE: 73 BPM | BODY MASS INDEX: 23.89 KG/M2 | WEIGHT: 148 LBS | SYSTOLIC BLOOD PRESSURE: 168 MMHG | OXYGEN SATURATION: 97 % | DIASTOLIC BLOOD PRESSURE: 78 MMHG

## 2024-06-17 DIAGNOSIS — I70.90 UNSPECIFIED ATHEROSCLEROSIS: ICD-10-CM

## 2024-06-17 PROCEDURE — G2211 COMPLEX E/M VISIT ADD ON: CPT

## 2024-06-17 PROCEDURE — 93000 ELECTROCARDIOGRAM COMPLETE: CPT

## 2024-06-17 PROCEDURE — 99214 OFFICE O/P EST MOD 30 MIN: CPT

## 2024-06-17 RX ORDER — ROSUVASTATIN CALCIUM 5 MG/1
5 TABLET, FILM COATED ORAL
Qty: 90 | Refills: 0 | Status: DISCONTINUED | COMMUNITY
Start: 2023-01-10 | End: 2024-06-17

## 2024-06-17 RX ORDER — AMLODIPINE BESYLATE 5 MG/1
5 TABLET ORAL DAILY
Qty: 90 | Refills: 3 | Status: ACTIVE | COMMUNITY
Start: 2023-01-10 | End: 1900-01-01

## 2024-06-17 NOTE — HISTORY OF PRESENT ILLNESS
[FreeTextEntry1] : She underwent PCI of her D1, OM x 2. She is concerned about. statin muscle issues...sent home on Lipitor 10. She doesn't want more. Concerned about nosebleeds. ENT  Prior; Ramila Phelan, Thank you for referring her back to me for cardiovascular evaluation.  She has not been seen here since 2016.  She is a 83-year-old with history of hyperlipidemia and coronary atherosclerosis who had been taking medicine for breast cancer related issues.  She reports feeling fatigued and at some point discontinued her rosuvastatin. Of late, over the past 6 or 8 weeks she has been noticing some exertional fatigue. He sometimes describes this as a back discomfort with may be some chest discomfort as well though it is nondescript.  She also notes stopping her exercise, possibly because of shortness of breath but not exactly clear. We referred her for a coronary CT angiogram which she hesitated to perform.She was instructed to start aspirin.  Chest x-ray was unremarkable.  Her most recent LDL was 160 mg/dL. In the interim years she has had no intervening cardiac events.    Prior: Ramila Phelan; She sought my advice regarding her elevated cholesterol, and enlarged heart, as well as coronary artery and aortic atherosclerotic plaque that was noted on her CT scan of her chest. She is a 70-year-old woman with a history of chronic hyperlipidemia, as well as breast cancer, including treatment with radiation therapy to her chest in both 2006 and 2013. She reports feeling left-sided sternal chest discomfort for the past month or so. There is some associated lightheadedness, but no exertional chest pains or shortness of breath. She reports no routine exercise, but is able to exert herself without chest discomfort. She has been under increased stress due to her cancer diagnosis. She was treated for GERD with 2 weeks of Prilosec and changes in her diet. She denies any history of diabetes, hypertension, Myocardial infarction, congestive heart air or family history of premature coronary artery disease. She reports having significant bone pain from aromatase inhibitors.

## 2024-06-17 NOTE — DISCUSSION/SUMMARY
[FreeTextEntry1] : She is a 83-year-old woman with chronic hyperlipidemia, and prior chest radiation Related to breast cancer, who has CAD now s/p PCI to her Om and D1.   ECG: NSR. CAD: s/p stents. No angina now. continue aspirin and plavix for at least 6 months. Continue statin. Refuses higher dose. HTN: BP suboptimal. increase Norvasc to 5 mg.   [EKG obtained to assist in diagnosis and management of assessed problem(s)] : EKG obtained to assist in diagnosis and management of assessed problem(s)

## 2024-06-17 NOTE — REVIEW OF SYSTEMS
[Dyspnea on exertion] : not dyspnea during exertion [Lower Ext Edema] : no extremity edema [Syncope] : syncope [Heartburn] : heartburn [Joint Pain] : joint pain [Negative] : Heme/Lymph

## 2024-07-14 ENCOUNTER — OUTPATIENT (OUTPATIENT)
Dept: OUTPATIENT SERVICES | Facility: HOSPITAL | Age: 84
LOS: 1 days | Discharge: HOME | End: 2024-07-14

## 2024-07-14 DIAGNOSIS — Z98.890 OTHER SPECIFIED POSTPROCEDURAL STATES: Chronic | ICD-10-CM

## 2024-07-14 DIAGNOSIS — H26.9 UNSPECIFIED CATARACT: Chronic | ICD-10-CM

## 2024-07-14 DIAGNOSIS — C50.919 MALIGNANT NEOPLASM OF UNSPECIFIED SITE OF UNSPECIFIED FEMALE BREAST: ICD-10-CM

## 2024-07-17 ENCOUNTER — NON-APPOINTMENT (OUTPATIENT)
Age: 84
End: 2024-07-17

## 2024-07-18 ENCOUNTER — APPOINTMENT (OUTPATIENT)
Dept: HEMATOLOGY ONCOLOGY | Facility: CLINIC | Age: 84
End: 2024-07-18
Payer: MEDICARE

## 2024-07-18 VITALS
SYSTOLIC BLOOD PRESSURE: 158 MMHG | BODY MASS INDEX: 23.95 KG/M2 | DIASTOLIC BLOOD PRESSURE: 80 MMHG | HEART RATE: 76 BPM | RESPIRATION RATE: 16 BRPM | TEMPERATURE: 98.3 F | WEIGHT: 148.37 LBS | OXYGEN SATURATION: 97 %

## 2024-07-18 DIAGNOSIS — R14.0 ABDOMINAL DISTENSION (GASEOUS): ICD-10-CM

## 2024-07-18 DIAGNOSIS — C50.911 MALIGNANT NEOPLASM OF UNSPECIFIED SITE OF RIGHT FEMALE BREAST: ICD-10-CM

## 2024-07-18 LAB
ANION GAP SERPL CALC-SCNC: 12 MMOL/L
BUN SERPL-MCNC: 13 MG/DL
CALCIUM SERPL-MCNC: 9.4 MG/DL
CHLORIDE SERPL-SCNC: 104 MMOL/L
CO2 SERPL-SCNC: 24 MMOL/L
CREAT SERPL-MCNC: 1.02 MG/DL
EGFR: 55 ML/MIN/1.73M2
GLUCOSE SERPL-MCNC: 98 MG/DL
POTASSIUM SERPL-SCNC: 4.1 MMOL/L
SODIUM SERPL-SCNC: 140 MMOL/L

## 2024-07-18 PROCEDURE — 99214 OFFICE O/P EST MOD 30 MIN: CPT

## 2024-07-18 PROCEDURE — G2211 COMPLEX E/M VISIT ADD ON: CPT

## 2024-07-24 ENCOUNTER — APPOINTMENT (OUTPATIENT)
Dept: CT IMAGING | Facility: IMAGING CENTER | Age: 84
End: 2024-07-24
Payer: MEDICARE

## 2024-07-24 PROCEDURE — 74177 CT ABD & PELVIS W/CONTRAST: CPT | Mod: 26,MH

## 2024-09-28 ENCOUNTER — EMERGENCY (EMERGENCY)
Facility: HOSPITAL | Age: 84
LOS: 1 days | Discharge: HOME | End: 2024-09-28
Attending: STUDENT IN AN ORGANIZED HEALTH CARE EDUCATION/TRAINING PROGRAM | Admitting: STUDENT IN AN ORGANIZED HEALTH CARE EDUCATION/TRAINING PROGRAM
Payer: MEDICARE

## 2024-09-28 VITALS
OXYGEN SATURATION: 100 % | SYSTOLIC BLOOD PRESSURE: 142 MMHG | HEART RATE: 75 BPM | RESPIRATION RATE: 18 BRPM | DIASTOLIC BLOOD PRESSURE: 70 MMHG

## 2024-09-28 VITALS
SYSTOLIC BLOOD PRESSURE: 147 MMHG | RESPIRATION RATE: 16 BRPM | HEART RATE: 71 BPM | TEMPERATURE: 99 F | OXYGEN SATURATION: 97 % | DIASTOLIC BLOOD PRESSURE: 62 MMHG

## 2024-09-28 DIAGNOSIS — H26.9 UNSPECIFIED CATARACT: Chronic | ICD-10-CM

## 2024-09-28 DIAGNOSIS — Z98.890 OTHER SPECIFIED POSTPROCEDURAL STATES: Chronic | ICD-10-CM

## 2024-09-28 LAB
ALBUMIN SERPL ELPH-MCNC: 4.4 G/DL — SIGNIFICANT CHANGE UP (ref 3.3–5)
ALP SERPL-CCNC: 83 U/L — SIGNIFICANT CHANGE UP (ref 40–120)
ALT FLD-CCNC: 14 U/L — SIGNIFICANT CHANGE UP (ref 4–33)
ANION GAP SERPL CALC-SCNC: 13 MMOL/L — SIGNIFICANT CHANGE UP (ref 7–14)
APTT BLD: 30.9 SEC — SIGNIFICANT CHANGE UP (ref 24.5–35.6)
AST SERPL-CCNC: 19 U/L — SIGNIFICANT CHANGE UP (ref 4–32)
BILIRUB SERPL-MCNC: 0.7 MG/DL — SIGNIFICANT CHANGE UP (ref 0.2–1.2)
BLD GP AB SCN SERPL QL: NEGATIVE — SIGNIFICANT CHANGE UP
BUN SERPL-MCNC: 18 MG/DL — SIGNIFICANT CHANGE UP (ref 7–23)
CALCIUM SERPL-MCNC: 9.5 MG/DL — SIGNIFICANT CHANGE UP (ref 8.4–10.5)
CHLORIDE SERPL-SCNC: 104 MMOL/L — SIGNIFICANT CHANGE UP (ref 98–107)
CO2 SERPL-SCNC: 23 MMOL/L — SIGNIFICANT CHANGE UP (ref 22–31)
CREAT SERPL-MCNC: 1.18 MG/DL — SIGNIFICANT CHANGE UP (ref 0.5–1.3)
EGFR: 46 ML/MIN/1.73M2 — LOW
GLUCOSE SERPL-MCNC: 114 MG/DL — HIGH (ref 70–99)
HCT VFR BLD CALC: 40.9 % — SIGNIFICANT CHANGE UP (ref 34.5–45)
HGB BLD-MCNC: 13.2 G/DL — SIGNIFICANT CHANGE UP (ref 11.5–15.5)
INR BLD: 1.03 RATIO — SIGNIFICANT CHANGE UP (ref 0.85–1.16)
MCHC RBC-ENTMCNC: 29 PG — SIGNIFICANT CHANGE UP (ref 27–34)
MCHC RBC-ENTMCNC: 32.3 GM/DL — SIGNIFICANT CHANGE UP (ref 32–36)
MCV RBC AUTO: 89.9 FL — SIGNIFICANT CHANGE UP (ref 80–100)
NRBC # BLD: 0 /100 WBCS — SIGNIFICANT CHANGE UP (ref 0–0)
NRBC # FLD: 0 K/UL — SIGNIFICANT CHANGE UP (ref 0–0)
PLATELET # BLD AUTO: 265 K/UL — SIGNIFICANT CHANGE UP (ref 150–400)
POTASSIUM SERPL-MCNC: 3.7 MMOL/L — SIGNIFICANT CHANGE UP (ref 3.5–5.3)
POTASSIUM SERPL-SCNC: 3.7 MMOL/L — SIGNIFICANT CHANGE UP (ref 3.5–5.3)
PROT SERPL-MCNC: 7.3 G/DL — SIGNIFICANT CHANGE UP (ref 6–8.3)
PROTHROM AB SERPL-ACNC: 12.2 SEC — SIGNIFICANT CHANGE UP (ref 9.9–13.4)
RBC # BLD: 4.55 M/UL — SIGNIFICANT CHANGE UP (ref 3.8–5.2)
RBC # FLD: 13.5 % — SIGNIFICANT CHANGE UP (ref 10.3–14.5)
RH IG SCN BLD-IMP: POSITIVE — SIGNIFICANT CHANGE UP
SODIUM SERPL-SCNC: 140 MMOL/L — SIGNIFICANT CHANGE UP (ref 135–145)
WBC # BLD: 8.79 K/UL — SIGNIFICANT CHANGE UP (ref 3.8–10.5)
WBC # FLD AUTO: 8.79 K/UL — SIGNIFICANT CHANGE UP (ref 3.8–10.5)

## 2024-09-28 PROCEDURE — 72125 CT NECK SPINE W/O DYE: CPT | Mod: 26,MC

## 2024-09-28 PROCEDURE — 70450 CT HEAD/BRAIN W/O DYE: CPT | Mod: 26,MC,77

## 2024-09-28 PROCEDURE — 72170 X-RAY EXAM OF PELVIS: CPT | Mod: 26

## 2024-09-28 PROCEDURE — 72220 X-RAY EXAM SACRUM TAILBONE: CPT | Mod: 26

## 2024-09-28 PROCEDURE — 70450 CT HEAD/BRAIN W/O DYE: CPT | Mod: 26,MC

## 2024-09-28 PROCEDURE — 99285 EMERGENCY DEPT VISIT HI MDM: CPT | Mod: 25,GC

## 2024-09-28 PROCEDURE — 12001 RPR S/N/AX/GEN/TRNK 2.5CM/<: CPT | Mod: GC

## 2024-09-28 PROCEDURE — 99283 EMERGENCY DEPT VISIT LOW MDM: CPT

## 2024-09-28 RX ORDER — ACETAMINOPHEN 325 MG/1
650 TABLET ORAL ONCE
Refills: 0 | Status: COMPLETED | OUTPATIENT
Start: 2024-09-28 | End: 2024-09-28

## 2024-09-28 RX ADMIN — ACETAMINOPHEN 650 MILLIGRAM(S): 325 TABLET ORAL at 12:23

## 2024-09-28 NOTE — ED PROVIDER NOTE - PATIENT PORTAL LINK FT
You can access the FollowMyHealth Patient Portal offered by [facility] by registering at the following website: [email or web address]. By joining [employer]’s FollowMyHealth portal, you will also be able to view your health information using other applications (apps) compatible with our system.

## 2024-09-28 NOTE — ED PROVIDER NOTE - PROGRESS NOTE DETAILS
Yuan Shaver MD PGY2: Patient reassessed, stable. CT with questionable trace SDH. NSx consulted, will see patient. Scalp wound repaired. Gon yo on dual antiplt a/w fall on metal door frame. Pt noted to have small SDH on CT. pending repeat 6 hr ct @ ~9AM. MADDISON Rose: case endorsed, pt reassessed, states she feels better, repeat CT head unchanged, spoke to neurosurg and pt is stable for discharge home. outpatient follow up. strict return precautions.

## 2024-09-28 NOTE — ED ADULT NURSE REASSESSMENT NOTE - NS ED NURSE REASSESS COMMENT FT1
Received patient in stable condition. Patient A&OX4. Breathing non-labored. Plan of care in progress.

## 2024-09-28 NOTE — ED PROVIDER NOTE - NSFOLLOWUPINSTRUCTIONS_ED_ALL_ED_FT
Please follow up in a week for staple removal.     Please follow up with Neurosurgeon within a week:   Gatito Sellers MD  Neurosurgery  [phone] [address], [place] [address]    Follow up with your PMD within 1-2 days or you can call our clinic at [phone] for an appointment  Take all of your other medications as previously prescribed.  Worsening, continued or ANY new concerning symptoms return to the Emergency Department.    DISCHARGE INSTRUCTIONS:    Call your local emergency number (911 in the ), or have someone call if:    You cannot be woken.    You have a seizure, increasing confusion, or a change in personality.    Your speech becomes slurred.  Return to the emergency department if:    You have sudden or new vision problems.    One of your pupils is bigger than the other.    You have a severe headache that does not go away.    You have arm or leg weakness, numbness, or new problems with coordination.    You have blood or clear fluid coming out of the ears or nose.    You cannot stop vomiting.  Call your doctor if:    You have nausea or are vomiting.    You feel more sleepy than usual.    Your symptoms get worse.    Your symptoms last longer than 6 weeks.    You have questions or concerns about your condition or care.

## 2024-09-28 NOTE — ED ADULT NURSE NOTE - NSFALLHARMRISKINTERV_ED_ALL_ED

## 2024-09-28 NOTE — CONSULT NOTE ADULT - SUBJECTIVE AND OBJECTIVE BOX
NEUROSURGERY CONSULT    HPI: 84y Female pmhx CAD on asa, plavix does not remember when she took her last dose. s/p trip and fall at home. CTh with ? trace sdh R. tentorial.    RADIOLOGY: IMPRESSION:  Head CT: Questionable tracesubdural hemorrhage along the right tentorial   leaflet with referenced images as above. No parenchymal, subarachnoid or   intraventricular hemorrhage. Intact calvarium.  Cervical spine CT: No acute cervical spine fracture or traumatic   malalignment. Mild degenerative changes.  Heterogeneous thyroid gland favoring goiter. This can be followed up with   nonemergent thyroid ultrasound as clinically warranted.    MEDS:        Vital Signs Last 24 Hrs  T(C): 36.6 (28 Sep 2024 06:52), Max: 36.6 (28 Sep 2024 02:43)  T(F): 97.8 (28 Sep 2024 06:52), Max: 97.8 (28 Sep 2024 02:43)  HR: 77 (28 Sep 2024 06:52) (70 - 77)  BP: 162/78 (28 Sep 2024 06:52) (142/70 - 162/78)  BP(mean): 100 (28 Sep 2024 06:52) (96 - 100)  RR: 18 (28 Sep 2024 06:52) (18 - 18)  SpO2: 100% (28 Sep 2024 06:52) (100% - 100%)    Parameters below as of 28 Sep 2024 06:52  Patient On (Oxygen Delivery Method): room air        LABS:                        13.2   8.79  )-----------( 265      ( 28 Sep 2024 00:45 )             40.9     09-28    140  |  104  |  18  ----------------------------<  114[H]  3.7   |  23  |  1.18    Ca    9.5      28 Sep 2024 00:45    TPro  7.3  /  Alb  4.4  /  TBili  0.7  /  DBili  x   /  AST  19  /  ALT  14  /  AlkPhos  83  09-28    PT/INR - ( 28 Sep 2024 00:45 )   PT: 12.2 sec;   INR: 1.03 ratio         PTT - ( 28 Sep 2024 00:45 )  PTT:30.9 sec      PHYSICAL EXAM:  awake, alert x 3, fc  perrl  mcmillan 5/5  silt

## 2024-09-28 NOTE — ED PROVIDER NOTE - OBJECTIVE STATEMENT
84-year-old female history of CAD on DAPT, and contrary to triage note is not on Eliquis, presenting emergency department status post mechanical fall after her head hit the back of a metal door frame and she landed on her coccyx.  Patient denies LOC.  Reports she has pain to the back of her head.  Per EMS has had some significant bleeding that was difficult to control with pressure bandages continue to use through the bandages.  Patient denies pain or trauma to her extremities or trunk.  Primarily just endorsing lower sacral/coccyx pain and pain to the back of the head.  Denies unilateral weakness, slurred speech or facial droop.  Denies chest pain or shortness of breath preceding the fall or actively.  Denies fever/chills.

## 2024-09-28 NOTE — ED PROVIDER NOTE - CLINICAL SUMMARY MEDICAL DECISION MAKING FREE TEXT BOX
84-year-old female history of CAD on DAPT, presenting emergency department status post mechanical fall after she got tangled her feet and her back of her head hit a metal door jam and landed on her coccyx.  Primarily endorsing low back pain in the region of the sacrum/coccyx and bleeding wound/hematoma to back of the head.  Denies LOC.  Not on overt AC such as Eliquis, Coumadin or Xarelto.  On exam patient is well-appearing.  Neurologically intact.  Head has large hematoma with bright red blood oozing from over the site.  Wound was irrigated and 1 to 2 cm superficial laceration appreciated.  No palpable skull fracture.  Patient without midline cervical tenderness to palpation ranging neck fully with positive C-spine clearance.  Will get basic labs in the setting of bleeding to rule out anemia, CT head, neck.  X-ray of pelvis and sacrum.  Plan for staple repair for laceration.  Dispo pending imaging.

## 2024-09-28 NOTE — ED ADULT NURSE REASSESSMENT NOTE - NS ED NURSE REASSESS COMMENT FT1
Upon reassessment pt sleeping in stretcher at this time, arousable to verbal and tactile stimuli. Respirations even and unlabored on room air. No acute distress noted. Denies headache, dizziness, chest pain and SOB at this time. No active bleeding noted from posterior of head at this time. Plan of care ongoing, comfort measures provided and safety measures maintained. Awaiting CT results.

## 2024-09-28 NOTE — ED PROCEDURE NOTE - ATTENDING CONTRIBUTION TO CARE
Lac repair performed by resident as above. I was present and available for assistance throughout the entirety of procedure

## 2024-09-28 NOTE — CONSULT NOTE ADULT - ASSESSMENT
84F s/p trip and fall with ? trace sdh R. tentorial , neuro intact    recc:  - rpt CTH 6h for stability

## 2024-09-28 NOTE — ED ADULT NURSE NOTE - OBJECTIVE STATEMENT
pt received to room 20 as notification s/p fall on blood thinners. pt head wrapped, bleeding through dressing left posterior head. pt reports slip and fall outside of the bathroom hitting head on metal door frame. pt denies LOC, prodromal symptoms. ambulatory without assist at baseline. reports incrwased stress as her  has been in the hospital. 20G IV placed right AC. pt received to room 20 as notification s/p fall on blood thinners. pt head wrapped, bleeding through dressing left posterior head. pt reports slip and fall outside of the bathroom hitting head on metal door frame. pt denies LOC, prodromal symptoms. ambulatory without assist at baseline. reports increased stress as her  has been in the hospital. 20G IV placed right AC. ED MD team at bedside for evaluation and wound care.

## 2024-09-28 NOTE — ED ADULT NURSE REASSESSMENT NOTE - NS ED NURSE REASSESS COMMENT FT1
Report received from sussy Baumann. Pt A&Ox3 sitting up in stretcher resting comfortably. Respirations even and unlabored on room air. No acute distress noted. Denies headache, dizziness, chest pain and SOB at this time. Pt noted to have small amount of bleeding from posterior of head at this time. MD Schmidt aware. Plan of care ongoing, comfort measures provided and safety measures maintained. Awaiting CT.

## 2024-09-28 NOTE — ED PROVIDER NOTE - DISCHARGE REVIEW MATERIAL PRESENTED
Final Anesthesia Post-op Assessment    Patient: Stephanie Leigh  Procedure(s) Performed: SALPINGECTOMY, LAPAROSCOPIC - BILATERALABLATION, ENDOMETRIUM - NOVASURE, HYSTEROSCOPYREMOVAL, INTRAUTERINE DEVICE  Anesthesia type: General    Vitals Value Taken Time   Temp 98 10/12/22 1153   Pulse 137 10/12/22 1152   Resp 13 10/12/22 1152   SpO2 100 % 10/12/22 1152   /60 10/12/22 1150   Vitals shown include unvalidated device data.      Patient Location: PACU Phase 1  Post-op Vital Signs:stable  Level of Consciousness: awake and alert  Respiratory Status: spontaneous ventilation  Cardiovascular stable  Hydration: euvolemic  Pain Management: adequately controlled  Handoff: Handoff to receiving clinician was performed and questions were answered  Vomiting: none  Nausea: None  Airway Patency:patent  Post-op Assessment: no complications and patient tolerated procedure well with no complications      No complications documented.    .

## 2024-09-28 NOTE — ED PROVIDER NOTE - PHYSICAL EXAMINATION
GEN: Patient awake and alert. No acute distress, non-toxic.   Head: Normocephalic, large posterior hematoma with active bright red oozing, pressure bandage in place. Approx 1-2 cm superficial laceration at base of bleeding. No appreciable foreign body or debris in wound.   Neck: Nontender, full ROM.   Eyes: PERRLA b/l. EOMI, no scleral icterus, no conjunctival injection. Moist mucous membranes.  CARDIAC: RRR. Normal S1, S2. No murmur, rubs, or gallops. No peripheral edema noted.  PULM: Speaking in full sentences. CTA B/L no wheeze, rales or rhonchi. No signs of respiratory distress, no accessory muscle usage or nasal flaring.  ABD: Soft, nontender, nondistended.   MSK: TTP over coccyx, remainder of midline spine without TTP. No step off or ecchymoses. Moving all extremities spontaneously. Full ROM in extremities. No obvious bony deformity. Pelvis stable.

## 2024-09-28 NOTE — ED PROVIDER NOTE - ATTENDING CONTRIBUTION TO CARE
84F h/o CAD on DAPT (not Eliquis) presenting s/p mechanical fall. Pt reports slipping causing her to strike her head on metal door frame with subsequent laceration. Per EMS had significant bleeding at scene that was difficult to control. No LOC though fall was unwitnessed. Also reports lower buttock/coccyx pain though denies neck/back pain, abd pain, dizziness/lightheadedness. No prodromal symptoms prior to fall. Denies focal neuro deficits.   Gen: awake and alert, non-toxic appearing  HEENT: PERRL, EOMI, no evidence facial trauma; posterior occipitut with hematoma and open 1-2cm wound oozing blood  CV: rrr, no appreciable murmur  Pulm: clear lungs  Abd: soft, ntnd  MSK: no midline neck/back tenderness or step offs; moving all extremities without gross deformities; no edema/swelling  Skin: no rash/petechiae/ecchymosis  Neuro: CN 2-12 grossly intact, motor 5/5 all extremities, sensation grossly intact  MDM: 84F h/o CAD on DAPT (not Eliquis) presenting s/p mechanical fall. On DAPT but not eliquis as stated in triage note - will obtain CTH to assess for any evidence of bleed. Will need wound closure with staples. Hemostasis achieved with pressure dressing. Can also obtain XRay of sacrum/coccyx/pelvis given buttock/"tail bone" pain.

## 2024-09-30 ENCOUNTER — NON-APPOINTMENT (OUTPATIENT)
Age: 84
End: 2024-09-30

## 2024-10-04 DIAGNOSIS — S06.5XAA TRAUMATIC SUBDURAL HEMORRHAGE WITH LOSS OF CONSCIOUSNESS STATUS UNKNOWN, INITIAL ENCOUNTER: ICD-10-CM

## 2024-10-04 NOTE — ED POST DISCHARGE NOTE - RESULT SUMMARY
Patient called requesting assistance with follow-up appointment with neurosurgery.  States she was only able to get an appointment for the week of 10–21.  Patient was connected to the discharge center to assist with sooner appointment.  Received a call from referral coordinator Dilia, states the patient was unable to come in to see neurosx sooner due to difficulty arranging transportation from home, so neurosurgery gave patient a referral for a CT head to be done prior to patient's appointment with neurosurgery.  Referral coordinator helped schedule the repeat CT head on October 14.  Asked for me to call patient to review her imaging results with her.  I called and left patient a voicemail on her cell phone with my callback information if she has further questions.  Patient also did not answer her home phone.

## 2024-10-08 ENCOUNTER — EMERGENCY (EMERGENCY)
Facility: HOSPITAL | Age: 84
LOS: 1 days | Discharge: HOME | End: 2024-10-08
Attending: EMERGENCY MEDICINE | Admitting: EMERGENCY MEDICINE
Payer: MEDICARE

## 2024-10-08 VITALS
RESPIRATION RATE: 18 BRPM | WEIGHT: 145.95 LBS | DIASTOLIC BLOOD PRESSURE: 78 MMHG | SYSTOLIC BLOOD PRESSURE: 138 MMHG | OXYGEN SATURATION: 99 % | HEART RATE: 95 BPM | HEIGHT: 66 IN | TEMPERATURE: 98 F

## 2024-10-08 DIAGNOSIS — H26.9 UNSPECIFIED CATARACT: Chronic | ICD-10-CM

## 2024-10-08 DIAGNOSIS — Z98.890 OTHER SPECIFIED POSTPROCEDURAL STATES: Chronic | ICD-10-CM

## 2024-10-08 PROCEDURE — L9995: CPT

## 2024-10-08 NOTE — ED PROVIDER NOTE - CLINICAL SUMMARY MEDICAL DECISION MAKING FREE TEXT BOX
Here for suture removal pt feels fine all removed without incident small eschar noted that will shed with time   RTED PRN  suture removal instructions provided

## 2024-10-08 NOTE — ED PROVIDER NOTE - CHIEF COMPLAINT
The patient is a 84y Female complaining of suture/staple removal. Skin Substitute Text: The defect edges were debeveled with a #15 scalpel blade.  Given the location of the defect, shape of the defect and the proximity to free margins a skin substitute graft was deemed most appropriate.  The graft material was trimmed to fit the size of the defect. The graft was then placed in the primary defect and oriented appropriately.

## 2024-10-08 NOTE — ED ADULT NURSE NOTE - OBJECTIVE STATEMENT
84 year old female, received to wellness. A&Ox4, ambulatory. Respirations equal and unlabored. Pt  had sutures placed 2 weeks ago, requesting removal. Denies any fevers, chills, pain tot he site. No acute distress noted. Safety maintained.

## 2024-10-08 NOTE — ED PROVIDER NOTE - NSFOLLOWUPINSTRUCTIONS_ED_ALL_ED_FT
Stitches Removal    WHAT YOU NEED TO KNOW:    Stitches are usually removed within 14 days, depending on the location of the wound. Your healthcare provider will tell you when to return to have your stitches removed. Your provider will use sterile forceps or tweezers to  the knot of each stitch. He or she will cut the stitch with scissors and pull the stitch out. You may feel a slight tug as the stitch comes out.    DISCHARGE INSTRUCTIONS:    Return to the emergency department if:    Your wound splits open or is starting to come apart.    You suddenly cannot move your injured joint.    You have sudden numbness around your wound.    You see red streaks coming from your wound.  Contact your healthcare provider if:    You have a fever and chills.    Your wound is red, warm, swollen, or leaking pus.    There is a bad smell coming from your wound.    You have increased pain in the wound area.    You have questions or concerns about your condition or care.  Care for the area after the stitches are removed:    Do not pull medical tape off. Your provider may place small strips of medical tape across your wound after the stitches have been removed. These strips will peel and fall of on their own. Do not pull them off.    Clean the area as directed. Carefully wash the area with soap and water. Pat the area dry with a clean towel. Check the area for signs of infection, such as redness, swelling, or pus. Also check that the wound is not coming apart.    Protect your wound. Your wound can swell, bleed, or split open if it is stretched or bumped. You may need to wear a bandage that supports your wound until it is completely healed.    Care for a scar. You may have a scar after the stitches are removed. Use sunblock if the area is exposed to the sun. Apply it every day after the stitches are removed. This will help prevent skin discoloration. Talk to your healthcare provider about medicines you can use to make the scar less visible. Some medicines are available without a prescription.  Follow up with your healthcare provider as directed: You may need to return in 3 to 5 days if the stitches are on your face. Stitches on your scalp need to be removed after 7 to 14 days. Stitches over joints may remain in place up to 14 days. Write down your questions so you remember to ask them during your visits.

## 2024-10-08 NOTE — ED PROVIDER NOTE - ENMT, MLM
Airway patent, Nasal mucosa clear. Mouth with normal mucosa. Throat has no vesicles, no oropharyngeal exudates and uvula is midline.  Midline lac w/ staple in place running vertically mid occiput well approximated with some eschar but no redness or expressible discharge

## 2024-10-08 NOTE — ED PROVIDER NOTE - PATIENT PORTAL LINK FT
You can access the FollowMyHealth Patient Portal offered by [facility] by registering at the following website: [email or web address]. By joining [employer]’s FollowMyHealth portal, you will also be able to view your health information using other applications (apps) compatible with our system.

## 2024-10-08 NOTE — ED PROCEDURE NOTE - CPROC ED DIRECTIONAL
Midline lac w/ staple in place running vertically mid occiput well approximated with some eschar but no redness or expressible discharge

## 2024-10-14 ENCOUNTER — APPOINTMENT (OUTPATIENT)
Dept: CT IMAGING | Facility: IMAGING CENTER | Age: 84
End: 2024-10-14
Payer: MEDICARE

## 2024-10-14 ENCOUNTER — APPOINTMENT (OUTPATIENT)
Dept: MRI IMAGING | Facility: IMAGING CENTER | Age: 84
End: 2024-10-14
Payer: MEDICARE

## 2024-10-14 ENCOUNTER — OUTPATIENT (OUTPATIENT)
Dept: OUTPATIENT SERVICES | Facility: HOSPITAL | Age: 84
LOS: 1 days | End: 2024-10-14
Payer: MEDICARE

## 2024-10-14 DIAGNOSIS — H26.9 UNSPECIFIED CATARACT: Chronic | ICD-10-CM

## 2024-10-14 DIAGNOSIS — Z98.890 OTHER SPECIFIED POSTPROCEDURAL STATES: Chronic | ICD-10-CM

## 2024-10-14 DIAGNOSIS — S06.5XAA TRAUMATIC SUBDURAL HEMORRHAGE WITH LOSS OF CONSCIOUSNESS STATUS UNKNOWN, INITIAL ENCOUNTER: ICD-10-CM

## 2024-10-14 PROCEDURE — 70450 CT HEAD/BRAIN W/O DYE: CPT | Mod: 26,MH

## 2024-10-21 ENCOUNTER — APPOINTMENT (OUTPATIENT)
Dept: NEUROSURGERY | Facility: CLINIC | Age: 84
End: 2024-10-21

## 2024-11-20 PROCEDURE — 70450 CT HEAD/BRAIN W/O DYE: CPT

## 2024-11-27 ENCOUNTER — APPOINTMENT (OUTPATIENT)
Dept: SURGERY | Facility: CLINIC | Age: 84
End: 2024-11-27
Payer: MEDICARE

## 2024-11-27 PROCEDURE — 99213K: CUSTOM

## 2024-11-27 RX ORDER — LETROZOLE TABLETS 2.5 MG/1
2.5 TABLET, FILM COATED ORAL
Qty: 90 | Refills: 3 | Status: ACTIVE | COMMUNITY
Start: 2024-11-27 | End: 1900-01-01

## 2025-03-10 ENCOUNTER — APPOINTMENT (OUTPATIENT)
Dept: HEMATOLOGY ONCOLOGY | Facility: CLINIC | Age: 85
End: 2025-03-10

## 2025-03-20 ENCOUNTER — OUTPATIENT (OUTPATIENT)
Dept: OUTPATIENT SERVICES | Facility: HOSPITAL | Age: 85
LOS: 1 days | End: 2025-03-20
Payer: MEDICARE

## 2025-03-20 ENCOUNTER — APPOINTMENT (OUTPATIENT)
Dept: MRI IMAGING | Facility: IMAGING CENTER | Age: 85
End: 2025-03-20
Payer: MEDICARE

## 2025-03-20 DIAGNOSIS — H26.9 UNSPECIFIED CATARACT: Chronic | ICD-10-CM

## 2025-03-20 DIAGNOSIS — Z00.8 ENCOUNTER FOR OTHER GENERAL EXAMINATION: ICD-10-CM

## 2025-03-20 DIAGNOSIS — Z98.890 OTHER SPECIFIED POSTPROCEDURAL STATES: Chronic | ICD-10-CM

## 2025-03-20 PROCEDURE — 72148 MRI LUMBAR SPINE W/O DYE: CPT | Mod: 26

## 2025-03-20 PROCEDURE — 72148 MRI LUMBAR SPINE W/O DYE: CPT | Mod: MH

## 2025-05-29 ENCOUNTER — APPOINTMENT (OUTPATIENT)
Dept: ULTRASOUND IMAGING | Facility: IMAGING CENTER | Age: 85
End: 2025-05-29

## 2025-05-29 ENCOUNTER — APPOINTMENT (OUTPATIENT)
Dept: MAMMOGRAPHY | Facility: IMAGING CENTER | Age: 85
End: 2025-05-29

## 2025-06-20 ENCOUNTER — RX RENEWAL (OUTPATIENT)
Age: 85
End: 2025-06-20

## 2025-07-10 ENCOUNTER — NON-APPOINTMENT (OUTPATIENT)
Age: 85
End: 2025-07-10

## 2025-07-10 ENCOUNTER — APPOINTMENT (OUTPATIENT)
Dept: CARDIOLOGY | Facility: CLINIC | Age: 85
End: 2025-07-10
Payer: MEDICARE

## 2025-07-10 VITALS
BODY MASS INDEX: 20.82 KG/M2 | HEART RATE: 79 BPM | OXYGEN SATURATION: 97 % | SYSTOLIC BLOOD PRESSURE: 140 MMHG | DIASTOLIC BLOOD PRESSURE: 70 MMHG | WEIGHT: 129 LBS

## 2025-07-10 PROBLEM — R00.2 PALPITATIONS: Status: ACTIVE | Noted: 2025-07-10

## 2025-07-10 PROCEDURE — 99214 OFFICE O/P EST MOD 30 MIN: CPT

## 2025-07-10 PROCEDURE — 93000 ELECTROCARDIOGRAM COMPLETE: CPT

## 2025-07-18 ENCOUNTER — APPOINTMENT (OUTPATIENT)
Age: 85
End: 2025-07-18

## 2025-07-25 ENCOUNTER — NON-APPOINTMENT (OUTPATIENT)
Age: 85
End: 2025-07-25

## 2025-07-25 RX ORDER — METOPROLOL SUCCINATE 25 MG/1
25 TABLET, EXTENDED RELEASE ORAL
Qty: 90 | Refills: 3 | Status: ACTIVE | COMMUNITY
Start: 2025-07-25 | End: 1900-01-01

## 2025-07-29 ENCOUNTER — APPOINTMENT (OUTPATIENT)
Dept: GERIATRICS | Facility: CLINIC | Age: 85
End: 2025-07-29
Payer: MEDICARE

## 2025-07-29 VITALS
BODY MASS INDEX: 20.41 KG/M2 | RESPIRATION RATE: 16 BRPM | SYSTOLIC BLOOD PRESSURE: 147 MMHG | TEMPERATURE: 97.9 F | DIASTOLIC BLOOD PRESSURE: 89 MMHG | HEART RATE: 77 BPM | OXYGEN SATURATION: 97 % | WEIGHT: 127 LBS | HEIGHT: 66 IN

## 2025-07-29 DIAGNOSIS — H50.9 UNSPECIFIED STRABISMUS: ICD-10-CM

## 2025-07-29 DIAGNOSIS — Z13.31 ENCOUNTER FOR SCREENING FOR DEPRESSION: ICD-10-CM

## 2025-07-29 DIAGNOSIS — F41.9 ANXIETY DISORDER, UNSPECIFIED: ICD-10-CM

## 2025-07-29 DIAGNOSIS — R63.4 ABNORMAL WEIGHT LOSS: ICD-10-CM

## 2025-07-29 DIAGNOSIS — M81.0 AGE-RELATED OSTEOPOROSIS W/OUT CURRENT PATHOLOGICAL FRACTURE: ICD-10-CM
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
## 2025-07-29 DIAGNOSIS — I47.10 SUPRAVENTRICULAR TACHYCARDIA, UNSPECIFIED: ICD-10-CM
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
## 2025-07-29 DIAGNOSIS — C50.911 MALIGNANT NEOPLASM OF UNSPECIFIED SITE OF RIGHT FEMALE BREAST: ICD-10-CM

## 2025-07-29 DIAGNOSIS — E78.00 PURE HYPERCHOLESTEROLEMIA, UNSPECIFIED: ICD-10-CM
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
## 2025-07-29 DIAGNOSIS — R25.2 CRAMP AND SPASM: ICD-10-CM

## 2025-07-29 PROCEDURE — 99205 OFFICE O/P NEW HI 60 MIN: CPT | Mod: GC

## 2025-07-29 PROCEDURE — G2211 COMPLEX E/M VISIT ADD ON: CPT

## 2025-07-30 ENCOUNTER — NON-APPOINTMENT (OUTPATIENT)
Age: 85
End: 2025-07-30

## 2025-07-30 LAB
25(OH)D3 SERPL-MCNC: 34.9 NG/ML
ANION GAP SERPL CALC-SCNC: 17 MMOL/L
BASOPHILS # BLD AUTO: 0.03 K/UL
BASOPHILS NFR BLD AUTO: 0.4 %
BUN SERPL-MCNC: 11 MG/DL
CALCIUM SERPL-MCNC: 9.9 MG/DL
CHLORIDE SERPL-SCNC: 102 MMOL/L
CO2 SERPL-SCNC: 20 MMOL/L
CREAT SERPL-MCNC: 0.92 MG/DL
EGFRCR SERPLBLD CKD-EPI 2021: 61 ML/MIN/1.73M2
EOSINOPHIL # BLD AUTO: 0.2 K/UL
EOSINOPHIL NFR BLD AUTO: 2.3 %
GLUCOSE SERPL-MCNC: 92 MG/DL
HCT VFR BLD CALC: 41.6 %
HGB BLD-MCNC: 13.1 G/DL
IMM GRANULOCYTES NFR BLD AUTO: 0.2 %
LYMPHOCYTES # BLD AUTO: 1.78 K/UL
LYMPHOCYTES NFR BLD AUTO: 20.8 %
MAN DIFF?: NORMAL
MCHC RBC-ENTMCNC: 29.6 PG
MCHC RBC-ENTMCNC: 31.5 G/DL
MCV RBC AUTO: 94.1 FL
MONOCYTES # BLD AUTO: 0.67 K/UL
MONOCYTES NFR BLD AUTO: 7.8 %
NEUTROPHILS # BLD AUTO: 5.85 K/UL
NEUTROPHILS NFR BLD AUTO: 68.5 %
PLATELET # BLD AUTO: 289 K/UL
POTASSIUM SERPL-SCNC: 4.4 MMOL/L
RBC # BLD: 4.42 M/UL
RBC # FLD: 15 %
SODIUM SERPL-SCNC: 140 MMOL/L
VIT B12 SERPL-MCNC: 481 PG/ML
WBC # FLD AUTO: 8.55 K/UL

## 2025-07-31 LAB — MAGNESIUM SERPL-MCNC: 2.1 MG/DL

## (undated) DEVICE — SUT VICRYL 3-0 27" SH UNDYED

## (undated) DEVICE — ELCTR BOVIE PENCIL SMOKE EVACUATION

## (undated) DEVICE — DRSG COMBINE 5X9"

## (undated) DEVICE — GLV 6 PROTEXIS (WHITE)

## (undated) DEVICE — PACK BREAST MINOR

## (undated) DEVICE — PROBE LOCALIZER W/ DRAPES

## (undated) DEVICE — POSITIONER STRAP ARMBOARD VELCRO TS-30

## (undated) DEVICE — ELCTR GROUNDING PAD ADULT COVIDIEN

## (undated) DEVICE — SUT MONOCRYL 4-0 27" PS-2 UNDYED

## (undated) DEVICE — SPECIMEN CONTAINER 100ML